# Patient Record
Sex: FEMALE | Race: WHITE | NOT HISPANIC OR LATINO | Employment: FULL TIME | URBAN - METROPOLITAN AREA
[De-identification: names, ages, dates, MRNs, and addresses within clinical notes are randomized per-mention and may not be internally consistent; named-entity substitution may affect disease eponyms.]

---

## 2020-12-03 ENCOUNTER — OFFICE VISIT (OUTPATIENT)
Dept: FAMILY MEDICINE CLINIC | Facility: CLINIC | Age: 60
End: 2020-12-03

## 2020-12-03 VITALS
OXYGEN SATURATION: 99 % | BODY MASS INDEX: 23.05 KG/M2 | HEIGHT: 64 IN | TEMPERATURE: 97.1 F | WEIGHT: 135 LBS | DIASTOLIC BLOOD PRESSURE: 80 MMHG | RESPIRATION RATE: 16 BRPM | SYSTOLIC BLOOD PRESSURE: 130 MMHG | HEART RATE: 67 BPM

## 2020-12-03 DIAGNOSIS — Z00.00 WELL ADULT EXAM: ICD-10-CM

## 2020-12-03 DIAGNOSIS — Z20.89 HOUSEHOLD CONTACT WITH HISTORY OF METHICILLIN RESISTANT STAPHYLOCOCCUS AUREUS (MRSA) INFECTION: ICD-10-CM

## 2020-12-03 DIAGNOSIS — Z12.31 SCREENING MAMMOGRAM, ENCOUNTER FOR: Primary | ICD-10-CM

## 2020-12-03 PROCEDURE — 99386 PREV VISIT NEW AGE 40-64: CPT | Performed by: INTERNAL MEDICINE

## 2020-12-03 RX ORDER — DIPHENOXYLATE HYDROCHLORIDE AND ATROPINE SULFATE 2.5; .025 MG/1; MG/1
TABLET ORAL DAILY
COMMUNITY

## 2020-12-03 NOTE — PROGRESS NOTES
"Subjective   Beronica Vinson is a 60 y.o. female who comes in today for   Chief Complaint   Patient presents with   • Annual Exam      cpe    • Establish Care     np    .    History of Present Illness   Here as a new pt to me.  Just moved back to [place] from [place] but her and her  are from [place] originally.  25 years ago her  had a heart transplant due to cardiomyopathy.  Recently he was hospitalized due to a skin infection and had to have skin grafting after an injury.  He was found to have MRSA.  Recently over the past month she has noticed some pimple-like areas on her face and scalp line.  She took some of his antibiotic and it cleared it up.  This week she is had some symptoms within her mouth that she is wondering if could be related to staph.  She has an appointment with her dentist this afternoon.  No fever.  No's other URI symptoms.  Has osteoporosis in the spine and she started on fosamax and it caused nose bleeds.  She stopped it 1/2020 and she chose to monitor her sxs.  Her dexa is due in 8/2021.  Her last pap smear was 18-24 months.  She is due for a pap smear.  MMG was 18 mo ago and was getting them yearly.  Had CN within the past 2-3 years in [place], [place] at [facility].  He has had a flu shot and has had her shingles vaccine.  She states that her last tetanus was about 5 years ago.  She is never treated herself with any kind of ointment in the nasal passages at night.  The following portions of the patient's history were reviewed and updated as appropriate: allergies, current medications, past family history, past medical history, past social history, past surgical history and problem list.    Review of Systems   Constitutional: Negative.    Skin:        MRSA contact at home with    Psychiatric/Behavioral: Negative.        /80   Pulse 67   Temp 97.1 °F (36.2 °C) (Temporal)   Resp 16   Ht 162.6 cm (64\")   Wt 61.2 kg (135 lb)   SpO2 99%   BMI 23.17 " kg/m²     STEADI Fall Risk Assessment has not been completed.    No flowsheet data found.    Objective   Physical Exam  Vitals signs and nursing note reviewed.   Constitutional:       Appearance: Normal appearance. She is well-developed and normal weight.   HENT:      Head: Normocephalic and atraumatic.      Right Ear: External ear normal.      Left Ear: External ear normal.      Mouth/Throat:      Mouth: Mucous membranes are moist.      Pharynx: Oropharynx is clear. No oropharyngeal exudate or posterior oropharyngeal erythema.   Eyes:      Conjunctiva/sclera: Conjunctivae normal.   Neck:      Musculoskeletal: Neck supple.      Vascular: No carotid bruit.   Cardiovascular:      Rate and Rhythm: Normal rate and regular rhythm.      Heart sounds: Normal heart sounds.      Comments: No bruits  Pulmonary:      Effort: Pulmonary effort is normal. No respiratory distress.      Breath sounds: Normal breath sounds. No wheezing or rales.   Abdominal:      General: Bowel sounds are normal. There is no distension.      Palpations: Abdomen is soft. There is no mass.      Tenderness: There is no abdominal tenderness.   Lymphadenopathy:      Cervical: No cervical adenopathy.   Skin:     General: Skin is warm.   Neurological:      General: No focal deficit present.      Mental Status: She is alert and oriented to person, place, and time.   Psychiatric:         Mood and Affect: Mood normal.         Behavior: Behavior normal.         Thought Content: Thought content normal.         Judgment: Judgment normal.           Current Outpatient Medications:   •  multivitamin (MULTI-VITAMINS PO), Take  by mouth Daily., Disp: , Rfl:   •  mupirocin (Bactroban) 2 % ointment, Apply  topically to the appropriate area as directed 3 (Three) Times a Day., Disp: 30 g, Rfl: 0    Assessment/Plan   Diagnoses and all orders for this visit:    1. Screening mammogram, encounter for (Primary)  -     Mammo Screening Bilateral With CAD    2. Well adult  exam  -     Mammo Screening Bilateral With CAD  -     CBC & Differential  -     Comprehensive Metabolic Panel  -     Lipid Panel With LDL / HDL Ratio  -     TSH  -     T4, Free  -     Urinalysis With Culture If Indicated -  -     Vitamin D 25 Hydroxy    3. Household contact with history of methicillin resistant Staphylococcus aureus (MRSA) infection  -     mupirocin (Bactroban) 2 % ointment; Apply  topically to the appropriate area as directed 3 (Three) Times a Day.  Dispense: 30 g; Refill: 0    I did order a screening mammogram as it is overdue.  I have also asked her to get her old images from [place] so that when she goes for her mammography in [place] they can have images to compare.  I have asked her to get old records from [place] that would include her vaccinations as well as her last colonoscopy and when to repeat it.  She is can work on that.  She will come back in 4 to 5 months for a Pap smear with me.  At that time we can adjust any kind of health maintenance issues.  I have ordered fasting labs today.  I did give her a prescription for mupirocin ointment to use in bilateral nostrils nightly for 7 days.  I also discussed with her she might want to talk to her  about reaching out to his doctor about ways to prevent colonization and future infection.               I have asked for the patient to return to clinic in 12month(s).

## 2020-12-04 ENCOUNTER — TRANSCRIBE ORDERS (OUTPATIENT)
Dept: ADMINISTRATIVE | Facility: HOSPITAL | Age: 60
End: 2020-12-04

## 2020-12-04 DIAGNOSIS — Z12.31 SCREENING MAMMOGRAM, ENCOUNTER FOR: Primary | ICD-10-CM

## 2020-12-06 LAB
25(OH)D3+25(OH)D2 SERPL-MCNC: 69.3 NG/ML (ref 30–100)
ALBUMIN SERPL-MCNC: 4.6 G/DL (ref 3.5–5.2)
ALBUMIN/GLOB SERPL: 2.2 G/DL
ALP SERPL-CCNC: 64 U/L (ref 39–117)
ALT SERPL-CCNC: 15 U/L (ref 1–33)
APPEARANCE UR: CLEAR
AST SERPL-CCNC: 17 U/L (ref 1–32)
BACTERIA #/AREA URNS HPF: NORMAL /HPF
BACTERIA UR CULT: NORMAL
BACTERIA UR CULT: NORMAL
BASOPHILS # BLD AUTO: 0.05 10*3/MM3 (ref 0–0.2)
BASOPHILS NFR BLD AUTO: 1 % (ref 0–1.5)
BILIRUB SERPL-MCNC: 0.5 MG/DL (ref 0–1.2)
BILIRUB UR QL STRIP: NEGATIVE
BUN SERPL-MCNC: 13 MG/DL (ref 8–23)
BUN/CREAT SERPL: 17.3 (ref 7–25)
CALCIUM SERPL-MCNC: 9.3 MG/DL (ref 8.6–10.5)
CHLORIDE SERPL-SCNC: 101 MMOL/L (ref 98–107)
CHOLEST SERPL-MCNC: 169 MG/DL (ref 0–200)
CO2 SERPL-SCNC: 29.9 MMOL/L (ref 22–29)
COLOR UR: YELLOW
CREAT SERPL-MCNC: 0.75 MG/DL (ref 0.57–1)
EOSINOPHIL # BLD AUTO: 0.11 10*3/MM3 (ref 0–0.4)
EOSINOPHIL NFR BLD AUTO: 2.2 % (ref 0.3–6.2)
EPI CELLS #/AREA URNS HPF: NORMAL /HPF (ref 0–10)
ERYTHROCYTE [DISTWIDTH] IN BLOOD BY AUTOMATED COUNT: 12.9 % (ref 12.3–15.4)
GLOBULIN SER CALC-MCNC: 2.1 GM/DL
GLUCOSE SERPL-MCNC: 89 MG/DL (ref 65–99)
GLUCOSE UR QL: NEGATIVE
HCT VFR BLD AUTO: 42.4 % (ref 34–46.6)
HDLC SERPL-MCNC: 78 MG/DL (ref 40–60)
HGB BLD-MCNC: 14 G/DL (ref 12–15.9)
HGB UR QL STRIP: NEGATIVE
IMM GRANULOCYTES # BLD AUTO: 0.01 10*3/MM3 (ref 0–0.05)
IMM GRANULOCYTES NFR BLD AUTO: 0.2 % (ref 0–0.5)
KETONES UR QL STRIP: NEGATIVE
LDLC SERPL CALC-MCNC: 81 MG/DL (ref 0–100)
LDLC/HDLC SERPL: 1.05 {RATIO}
LEUKOCYTE ESTERASE UR QL STRIP: ABNORMAL
LYMPHOCYTES # BLD AUTO: 1.51 10*3/MM3 (ref 0.7–3.1)
LYMPHOCYTES NFR BLD AUTO: 30 % (ref 19.6–45.3)
MCH RBC QN AUTO: 30.8 PG (ref 26.6–33)
MCHC RBC AUTO-ENTMCNC: 33 G/DL (ref 31.5–35.7)
MCV RBC AUTO: 93.2 FL (ref 79–97)
MICRO URNS: ABNORMAL
MONOCYTES # BLD AUTO: 0.45 10*3/MM3 (ref 0.1–0.9)
MONOCYTES NFR BLD AUTO: 8.9 % (ref 5–12)
NEUTROPHILS # BLD AUTO: 2.9 10*3/MM3 (ref 1.7–7)
NEUTROPHILS NFR BLD AUTO: 57.7 % (ref 42.7–76)
NITRITE UR QL STRIP: NEGATIVE
NRBC BLD AUTO-RTO: 0 /100 WBC (ref 0–0.2)
PH UR STRIP: 7.5 [PH] (ref 5–7.5)
PLATELET # BLD AUTO: 220 10*3/MM3 (ref 140–450)
POTASSIUM SERPL-SCNC: 4 MMOL/L (ref 3.5–5.2)
PROT SERPL-MCNC: 6.7 G/DL (ref 6–8.5)
PROT UR QL STRIP: NEGATIVE
RBC # BLD AUTO: 4.55 10*6/MM3 (ref 3.77–5.28)
RBC #/AREA URNS HPF: NORMAL /HPF (ref 0–2)
SODIUM SERPL-SCNC: 138 MMOL/L (ref 136–145)
SP GR UR: 1.01 (ref 1–1.03)
T4 FREE SERPL-MCNC: 1.09 NG/DL (ref 0.93–1.7)
TRIGL SERPL-MCNC: 45 MG/DL (ref 0–150)
TSH SERPL DL<=0.005 MIU/L-ACNC: 1.36 UIU/ML (ref 0.27–4.2)
URINALYSIS REFLEX: ABNORMAL
UROBILINOGEN UR STRIP-MCNC: 0.2 MG/DL (ref 0.2–1)
VLDLC SERPL CALC-MCNC: 10 MG/DL (ref 5–40)
WBC # BLD AUTO: 5.03 10*3/MM3 (ref 3.4–10.8)
WBC #/AREA URNS HPF: NORMAL /HPF (ref 0–5)

## 2020-12-24 ENCOUNTER — HOSPITAL ENCOUNTER (OUTPATIENT)
Dept: MAMMOGRAPHY | Facility: HOSPITAL | Age: 60
Discharge: HOME | End: 2020-12-24
Admitting: INTERNAL MEDICINE

## 2020-12-24 DIAGNOSIS — Z12.31 SCREENING MAMMOGRAM, ENCOUNTER FOR: ICD-10-CM

## 2020-12-24 PROCEDURE — 77063 BREAST TOMOSYNTHESIS BI: CPT

## 2020-12-24 PROCEDURE — 77067 SCR MAMMO BI INCL CAD: CPT

## 2021-01-19 DIAGNOSIS — M54.50 LOW BACK PAIN WITHOUT SCIATICA, UNSPECIFIED BACK PAIN LATERALITY, UNSPECIFIED CHRONICITY: Primary | ICD-10-CM

## 2021-01-22 LAB
APPEARANCE UR: CLEAR
BACTERIA #/AREA URNS HPF: NORMAL /HPF
BACTERIA UR CULT: NORMAL
BACTERIA UR CULT: NORMAL
BILIRUB UR QL STRIP: NEGATIVE
COLOR UR: YELLOW
EPI CELLS #/AREA URNS HPF: NORMAL /HPF (ref 0–10)
GLUCOSE UR QL: NEGATIVE
HGB UR QL STRIP: NEGATIVE
KETONES UR QL STRIP: NEGATIVE
LEUKOCYTE ESTERASE UR QL STRIP: ABNORMAL
MICRO URNS: ABNORMAL
MUCOUS THREADS URNS QL MICRO: PRESENT /HPF
NITRITE UR QL STRIP: NEGATIVE
PH UR STRIP: 7.5 [PH] (ref 5–7.5)
PROT UR QL STRIP: NEGATIVE
RBC #/AREA URNS HPF: NORMAL /HPF (ref 0–2)
SP GR UR: 1.02 (ref 1–1.03)
URINALYSIS REFLEX: ABNORMAL
UROBILINOGEN UR STRIP-MCNC: 0.2 MG/DL (ref 0.2–1)
WBC #/AREA URNS HPF: NORMAL /HPF (ref 0–5)

## 2021-03-22 ENCOUNTER — BULK ORDERING (OUTPATIENT)
Dept: CASE MANAGEMENT | Facility: OTHER | Age: 61
End: 2021-03-22

## 2021-03-22 DIAGNOSIS — Z23 IMMUNIZATION DUE: ICD-10-CM

## 2021-05-05 ENCOUNTER — OFFICE VISIT (OUTPATIENT)
Dept: FAMILY MEDICINE CLINIC | Facility: CLINIC | Age: 61
End: 2021-05-05

## 2021-05-05 VITALS
BODY MASS INDEX: 24.07 KG/M2 | DIASTOLIC BLOOD PRESSURE: 82 MMHG | HEART RATE: 63 BPM | OXYGEN SATURATION: 98 % | WEIGHT: 141 LBS | RESPIRATION RATE: 16 BRPM | HEIGHT: 64 IN | SYSTOLIC BLOOD PRESSURE: 130 MMHG | TEMPERATURE: 97.5 F

## 2021-05-05 DIAGNOSIS — M81.0 OSTEOPOROSIS, UNSPECIFIED OSTEOPOROSIS TYPE, UNSPECIFIED PATHOLOGICAL FRACTURE PRESENCE: Primary | ICD-10-CM

## 2021-05-05 DIAGNOSIS — Z78.0 MENOPAUSE: ICD-10-CM

## 2021-05-05 DIAGNOSIS — Z01.419 ENCOUNTER FOR CERVICAL PAP SMEAR WITH PELVIC EXAM: ICD-10-CM

## 2021-05-05 PROCEDURE — 99214 OFFICE O/P EST MOD 30 MIN: CPT | Performed by: INTERNAL MEDICINE

## 2021-05-05 NOTE — PROGRESS NOTES
"Chief Complaint  pap and ref (pt states needs ref to podiatrist rt big toe )    Subjective          Beronica Vinson presents to [facility]  History of Present Illness  Here for pap smear and breast exam.  Has a jammed, sore toe on the right second toe for few months.  Wants a podiatry referral.  Last pap smear over a year or two ago.  No h/o abnormal pap smears.  No PM bleeding or pain.  Moving bowels normally for her.  MMG neg 12/2020.  CN 11/2015 and repeat in 10 years.   Has had covid vaccinations.  dexa is due in 9/2021 and previous two in 2017 and 2019 showed osteoporosis.  She didn't tolerate fosamax due to nose bleeds.  Working at [employer] firm.  Takes an otc D3 and level is great.   Objective   Vital Signs:   /82   Pulse 63   Temp 97.5 °F (36.4 °C) (Temporal)   Resp 16   Ht 162.6 cm (64\")   Wt 64 kg (141 lb)   SpO2 98%   BMI 24.20 kg/m²     Physical Exam  Vitals and nursing note reviewed.   Constitutional:       Appearance: Normal appearance. She is well-developed.   HENT:      Head: Normocephalic and atraumatic.   Eyes:      Extraocular Movements: Extraocular movements intact.      Conjunctiva/sclera: Conjunctivae normal.   Pulmonary:      Effort: Pulmonary effort is normal.   Chest:      Breasts:         Right: No inverted nipple, mass, nipple discharge, skin change or tenderness.         Left: No inverted nipple, mass, nipple discharge, skin change or tenderness.   Genitourinary:     Exam position: Knee-chest position.      Labia:         Right: No rash, tenderness, lesion or injury.         Left: No rash, tenderness, lesion or injury.       Vagina: Normal.      Cervix: No cervical motion tenderness, discharge or friability.      Uterus: Not deviated, not enlarged, not fixed and not tender.       Adnexa:         Right: No mass, tenderness or fullness.          Left: No mass, tenderness or fullness.        Rectum: Normal. Guaiac result negative.   Skin:     General: " Skin is warm and dry.   Neurological:      General: No focal deficit present.      Mental Status: She is alert and oriented to person, place, and time.   Psychiatric:         Mood and Affect: Mood normal.         Behavior: Behavior normal.         Thought Content: Thought content normal.         Judgment: Judgment normal.        Result Review :                Mammo Screening Digital Tomosynthesis Bilateral With CAD (12/24/2020 11:42)    Assessment and Plan    Diagnoses and all orders for this visit:    1. Osteoporosis, unspecified osteoporosis type, unspecified pathological fracture presence (Primary)    2. Encounter for cervical Pap smear with pelvic exam    3. Menopause  -     DEXA Bone Density Axial; Future        Follow Up   No follow-ups on file.  Patient was given instructions and counseling regarding her condition or for health maintenance advice. Please see specific information pulled into the AVS if appropriate.       Answers for HPI/ROS submitted by the patient on 4/28/2021  What is the primary reason for your visit?: Physical    I have reviewed records from [place]; DEXA is from 2017 and 2019 which show osteoporosis.  The 2019 DEXA does show decline in bone density.  However she did not tolerate the Fosamax due to nosebleeds.  We have discussed alternative options for treatment of osteoporosis including Actonel, Boniva, Prolia, or reclass.  At this time she declines other treatment and would like to move forward for repeat DEXA scan in September 2021 which I have ordered.  In the meantime, she will continue her vitamin D supplementation and she will work on increasing her exercise and weightbearing activities.  She also is good about eating calcium in her diet and avoid soft drinks.  Her Pap smear was done today and her mammogram is up-to-date.  I will see her back in 1 year for a physical.  Total time coordinating care and reviewing old records and proposing future plan 25 minutes

## 2021-05-07 LAB
CONV .: NORMAL
CYTOLOGIST CVX/VAG CYTO: NORMAL
CYTOLOGY CVX/VAG DOC CYTO: NORMAL
CYTOLOGY CVX/VAG DOC THIN PREP: NORMAL
DX ICD CODE: NORMAL
HIV 1 & 2 AB SER-IMP: NORMAL
OTHER STN SPEC: NORMAL
STAT OF ADQ CVX/VAG CYTO-IMP: NORMAL

## 2021-09-08 ENCOUNTER — OFFICE VISIT (OUTPATIENT)
Dept: FAMILY MEDICINE CLINIC | Facility: CLINIC | Age: 61
End: 2021-09-08

## 2021-09-08 VITALS
RESPIRATION RATE: 16 BRPM | TEMPERATURE: 98.2 F | HEART RATE: 73 BPM | WEIGHT: 141.7 LBS | SYSTOLIC BLOOD PRESSURE: 152 MMHG | HEIGHT: 64 IN | DIASTOLIC BLOOD PRESSURE: 85 MMHG | OXYGEN SATURATION: 99 % | BODY MASS INDEX: 24.19 KG/M2

## 2021-09-08 DIAGNOSIS — L03.011 ABSCESS AROUND FINGERNAIL OF RIGHT HAND: Primary | ICD-10-CM

## 2021-09-08 DIAGNOSIS — L03.019 PARONYCHIA OF FINGER, UNSPECIFIED LATERALITY: ICD-10-CM

## 2021-09-08 PROCEDURE — 10060 I&D ABSCESS SIMPLE/SINGLE: CPT | Performed by: NURSE PRACTITIONER

## 2021-09-08 RX ORDER — AMOXICILLIN AND CLAVULANATE POTASSIUM 875; 125 MG/1; MG/1
1 TABLET, FILM COATED ORAL 2 TIMES DAILY
Qty: 14 TABLET | Refills: 0 | Status: SHIPPED | OUTPATIENT
Start: 2021-09-08 | End: 2021-09-17

## 2021-09-08 RX ORDER — IBUPROFEN 800 MG/1
800 TABLET ORAL EVERY 8 HOURS PRN
Qty: 20 TABLET | Refills: 0 | Status: SHIPPED | OUTPATIENT
Start: 2021-09-08 | End: 2022-05-25

## 2021-09-08 NOTE — PROGRESS NOTES
"Chief Complaint  Pain (POSSIBLE INFECTION ON MIDDLE FINGER ON RIGHT HAND. PT STATES SHE NOTICED IT GETTING BAD YESTERDAY. )    Subjective          Beronica Vinson presents to [facility]  Right middle finger infection since last night pain and tenderness no fever no chills  Nothing makes better or worse symptoms are moderate  No sweats diaphoresis or fatigue      Objective   Vital Signs:   /85   Pulse 73   Temp 98.2 °F (36.8 °C) (Infrared)   Resp 16   Ht 162.6 cm (64\")   Wt 64.3 kg (141 lb 11.2 oz)   SpO2 99%   BMI 24.32 kg/m²     Physical Exam  Constitutional:       General: She is not in acute distress.     Appearance: Normal appearance. She is not ill-appearing, toxic-appearing or diaphoretic.      Comments: Pleasant appears well   Musculoskeletal:      Comments: Left middle finger distal lateral abscess grayish-white approximately 1/3 cm fluctuant with the lateral aspect red tender volar aspect nontender some redness the medial aspect approximately approximately 2 cm and  no streaks of the arms  Normal range of motion normal hand normal wrist   Neurological:      Mental Status: She is alert.   Psychiatric:         Mood and Affect: Mood normal.         Behavior: Behavior normal.        Result Review :                 Assessment and Plan    There are no diagnoses linked to this encounter.    Follow Up   No follow-ups on file.  Patient was given instructions and counseling regarding her condition or for health maintenance advice. Please see specific information pulled into the AVS if appropriate.     Procedure  Risk-benefit and alternatives discussed  She has an abscess and potentially serious infection I&D is the appropriate treatment risk-benefit  She would like to proceed with I&D and antibiotic therapy with close follow-up    Procedure  After explaining the procedure thoroughly  1 cc lidocaine without  Injected right middle finger distally proximal to the abscess " distally and surrounding tissue  After cleaning with alcohol.  Copious irrigation  Appropriate draping  Using aseptic technique carefully  Used a scalpel and made a careful incision approximately 5 mm with an approximately 5 mm depth.  Expressed approximate 1/2 cm purulent drainage  Obtained wound culture  Carefully inspected abscess and finger  Carefully expressed and firmly any remaining purulence  Copious irrigation  Dressing applied  Hemostasis without intervention  Patient tolerated procedure well    Appropriate dressing appropriate teaching    She will start the antibiotic now Augmentin  Keep and heart level  Soak 3 times a day soapy water then appropriate dressing  Express to keep the wound draining I&D draining the next several days  She will follow-up with me in 2 days for recheck  Any increasing pain fever chills streaks up the arms or hands  Any worsening problems should to be fever nausea vomiting or severe hand pain finger pain worsening symptoms emergency room    Patient is leaving without distress she can use ibuprofen 803 times a day for pain if needed          Risk include treatment failure further injury chronic paresthesia  She will need close follow-up Augmentin in the systemic signs of fever  Nausea vomiting high fever increased fatigue weakness palpitations racing heart emergency room

## 2021-09-10 ENCOUNTER — OFFICE VISIT (OUTPATIENT)
Dept: FAMILY MEDICINE CLINIC | Facility: CLINIC | Age: 61
End: 2021-09-10

## 2021-09-10 VITALS
BODY MASS INDEX: 24.2 KG/M2 | HEIGHT: 64 IN | TEMPERATURE: 97.7 F | WEIGHT: 141.76 LBS | RESPIRATION RATE: 16 BRPM | DIASTOLIC BLOOD PRESSURE: 60 MMHG | SYSTOLIC BLOOD PRESSURE: 118 MMHG | OXYGEN SATURATION: 99 % | HEART RATE: 69 BPM

## 2021-09-10 DIAGNOSIS — L03.019 PARONYCHIA OF FINGER, UNSPECIFIED LATERALITY: ICD-10-CM

## 2021-09-10 DIAGNOSIS — Z22.322 MRSA (METHICILLIN RESISTANT STAPH AUREUS) CULTURE POSITIVE: Primary | ICD-10-CM

## 2021-09-10 PROCEDURE — 99024 POSTOP FOLLOW-UP VISIT: CPT | Performed by: NURSE PRACTITIONER

## 2021-09-10 NOTE — PROGRESS NOTES
"Chief Complaint  Follow-up (infected right finger)    Subjective          Beronica Vinson presents to [facility]  Patient is here today to discuss recent culture positive MRSA sensitive to clindamycin regarding a recent I&D and abscess,  I switched her medication to clindamycin, she is feeling much better she has good movement no increasing pain no fever no chills  Skin is closed with a small scab distal tip.          Objective   Vital Signs:   /60   Pulse 69   Temp 97.7 °F (36.5 °C) (Infrared)   Resp 16   Ht 162.6 cm (64\")   Wt 64.3 kg (141 lb 12.1 oz)   SpO2 99%   BMI 24.33 kg/m²     Physical Exam  Constitutional:       Appearance: Normal appearance. She is not ill-appearing or diaphoretic.   Pulmonary:      Effort: Pulmonary effort is normal.   Musculoskeletal:         General: Normal range of motion.      Comments: Left hand middle finger  Distal tip healing nicely there is no redness no tenderness swelling has resolved and she has a scab on the lateral aspect.  Palpated each joint DIP PIP metacarpal joint as well as all flexor and extensor tendons including hand and wrist full range of motion nontender    Skin:     Capillary Refill: Capillary refill takes less than 2 seconds.   Neurological:      General: No focal deficit present.      Mental Status: She is alert and oriented to person, place, and time.        Result Review :                 Assessment and Plan    Diagnoses and all orders for this visit:    1. MRSA (methicillin resistant staph aureus) culture positive (Primary)    2. Paronychia of finger, unspecified laterality        Follow Up   No follow-ups on file.  Patient was given instructions and counseling regarding her condition or for health maintenance advice. Please see specific information pulled into the AVS if appropriate.     Clindamycin to be extended for total of 12 days  Risk-benefit including risk of colitis  Yogurt  Follow-up Dr. Dyer for " routine care    Office visit 15 minutes

## 2021-09-11 LAB
BACTERIA SPEC CULT: ABNORMAL
MICROORGANISM/AGENT SPEC: ABNORMAL
OTHER ANTIBIOTIC SUSC ISLT: ABNORMAL

## 2021-09-13 RX ORDER — CLINDAMYCIN HYDROCHLORIDE 300 MG/1
300 CAPSULE ORAL 4 TIMES DAILY
Qty: 28 CAPSULE | Refills: 0 | Status: SHIPPED | OUTPATIENT
Start: 2021-09-13 | End: 2021-09-17 | Stop reason: SDUPTHER

## 2021-09-14 ENCOUNTER — TELEPHONE (OUTPATIENT)
Dept: FAMILY MEDICINE CLINIC | Facility: CLINIC | Age: 61
End: 2021-09-14

## 2021-09-14 NOTE — TELEPHONE ENCOUNTER
Hub staff attempted to follow warm transfer process and was unsuccessful     Caller: Beronica Vinson    Relationship to patient: Self    Best call back number: [phone] (H)    Patient is needing: APPOINTMENT BEING SCHEDULED FOR Friday PER DR. NESS.        THANKS

## 2021-09-17 ENCOUNTER — OFFICE VISIT (OUTPATIENT)
Dept: FAMILY MEDICINE CLINIC | Facility: CLINIC | Age: 61
End: 2021-09-17

## 2021-09-17 ENCOUNTER — HOSPITAL ENCOUNTER (OUTPATIENT)
Dept: BONE DENSITY | Facility: HOSPITAL | Age: 61
End: 2021-09-17

## 2021-09-17 VITALS
DIASTOLIC BLOOD PRESSURE: 73 MMHG | OXYGEN SATURATION: 98 % | HEART RATE: 64 BPM | SYSTOLIC BLOOD PRESSURE: 133 MMHG | TEMPERATURE: 96.8 F

## 2021-09-17 DIAGNOSIS — L08.9 INFECTION OF SKIN DUE TO METHICILLIN RESISTANT STAPHYLOCOCCUS AUREUS (MRSA): Primary | ICD-10-CM

## 2021-09-17 DIAGNOSIS — B95.62 INFECTION OF SKIN DUE TO METHICILLIN RESISTANT STAPHYLOCOCCUS AUREUS (MRSA): Primary | ICD-10-CM

## 2021-09-17 PROCEDURE — 99024 POSTOP FOLLOW-UP VISIT: CPT | Performed by: NURSE PRACTITIONER

## 2021-09-17 RX ORDER — CLINDAMYCIN HYDROCHLORIDE 300 MG/1
300 CAPSULE ORAL 4 TIMES DAILY
Qty: 20 CAPSULE | Refills: 0 | Status: SHIPPED | OUTPATIENT
Start: 2021-09-17 | End: 2022-05-25

## 2021-09-17 NOTE — PROGRESS NOTES
Patient is doing qian without pain full range of motion of her finger no redness or swelling has a scab which is healing  MRSA positive with a small abscess and paronychia  Status post I&D,  Started on clindamycin recently will extend this up to 12 days patient agrees and would like to do so as well of her   Immunosuppressed with healthy heart replacement 25 years ago  She is fully vaccinated Covid

## 2021-09-17 NOTE — PATIENT INSTRUCTIONS
Patient doing quite well responded nicely to the antibiotics  She HAS  an MRSA infection, and continuing this for 5 more days for total of 12 days treatment    Increased pain redness swelling urgent recheck er

## 2021-09-27 NOTE — PROGRESS NOTES
Chief Complaint  Hand Injury (F/U R MIDDLE FINGES ABCESS)    Subjective          Beronica Vinson presents to [facility]  Patient is here today follow-up from a positive MRSA culture  Right middle finger abscess grew staph, MRSA but sensitive to clindamycin  Antibiotics were changed to clindamycin for a total of 12 days several discussions have had with patient since then she is doing quite well no fevers no chills no pain  She has full range of motion and full function of her hand without increased pain numbness or any disability and overall she is very pleased.  No problems with clindamycin taking yogurt as well  We discussed risk and benefit thoroughly including the risk of C. difficile she has no history of C. Difficile        Hand Injury         Objective   Vital Signs:   /73   Pulse 64   Temp 96.8 °F (36 °C)   SpO2 98%     Physical Exam  Constitutional:       Appearance: Normal appearance.   Musculoskeletal:      Comments: Full range of motion of left hand and finger she has full extension DIP PIP metacarpal joint without redness or pain she has a small scab and some healing scar tissue along the aspect lateral of her finger but no palpable abscess no redness no streaking it looks very very nice   Skin:     General: Skin is warm and dry.   Neurological:      Mental Status: She is alert.   Psychiatric:         Mood and Affect: Mood normal.        Result Review :                 Assessment and Plan    Diagnoses and all orders for this visit:    1. Infection of skin due to methicillin resistant Staphylococcus aureus (MRSA) (Primary)    Other orders  -     clindamycin (Cleocin) 300 MG capsule; Take 1 capsule by mouth 4 (Four) Times a Day. For MRSA infection sensitive to clindamycin, additional 5 days  Dispense: 20 capsule; Refill: 0        Follow Up   No follow-ups on file.  Patient was given instructions and counseling regarding her condition or for health maintenance  advice. Please see specific information pulled into the AVS if appropriate.   She will complete the course of clindamycin  Recheck for any new abscess formation urgently    Follow-up with Dr. Dyer routine care urgent recheck ER for any night sweats fevers chest pain shortness of breath    Patient is doing qian without pain full range of motion of her finger no redness or swelling has a scab which is healing  MRSA positive with a small abscess and paronychia  Status post I&D,  Started on clindamycin recently will extend this up to 12 days patient agrees and would like to do so as well of her   Immunosuppressed with healthy heart replacement 25 years ago  She is fully vaccinated Covid

## 2022-01-07 ENCOUNTER — APPOINTMENT (OUTPATIENT)
Dept: BONE DENSITY | Facility: HOSPITAL | Age: 62
End: 2022-01-07

## 2022-01-10 ENCOUNTER — HOSPITAL ENCOUNTER (OUTPATIENT)
Dept: BONE DENSITY | Facility: HOSPITAL | Age: 62
Discharge: HOME | End: 2022-01-10
Admitting: INTERNAL MEDICINE

## 2022-01-10 DIAGNOSIS — Z78.0 MENOPAUSE: ICD-10-CM

## 2022-01-10 PROCEDURE — 77080 DXA BONE DENSITY AXIAL: CPT

## 2022-01-13 RX ORDER — RISEDRONATE SODIUM 150 MG/1
150 TABLET, FILM COATED ORAL
Qty: 4 TABLET | Refills: 3 | Status: SHIPPED | OUTPATIENT
Start: 2022-01-13 | End: 2023-04-05

## 2022-05-25 ENCOUNTER — OFFICE VISIT (OUTPATIENT)
Dept: FAMILY MEDICINE CLINIC | Facility: CLINIC | Age: 62
End: 2022-05-25

## 2022-05-25 ENCOUNTER — TRANSCRIBE ORDERS (OUTPATIENT)
Dept: ADMINISTRATIVE | Facility: HOSPITAL | Age: 62
End: 2022-05-25

## 2022-05-25 VITALS
TEMPERATURE: 96.4 F | BODY MASS INDEX: 24.41 KG/M2 | RESPIRATION RATE: 16 BRPM | OXYGEN SATURATION: 98 % | HEIGHT: 64 IN | WEIGHT: 143 LBS | HEART RATE: 64 BPM | SYSTOLIC BLOOD PRESSURE: 112 MMHG | DIASTOLIC BLOOD PRESSURE: 82 MMHG

## 2022-05-25 DIAGNOSIS — Z00.00 WELL ADULT EXAM: ICD-10-CM

## 2022-05-25 DIAGNOSIS — Z12.31 SCREENING MAMMOGRAM, ENCOUNTER FOR: Primary | ICD-10-CM

## 2022-05-25 DIAGNOSIS — Z12.31 SCREENING MAMMOGRAM FOR HIGH-RISK PATIENT: Primary | ICD-10-CM

## 2022-05-25 PROCEDURE — 99396 PREV VISIT EST AGE 40-64: CPT | Performed by: INTERNAL MEDICINE

## 2022-05-25 NOTE — PROGRESS NOTES
"Chief Complaint  Annual Exam    Subjective          Beronica Vinson presents to [facility]  History of Present Illness  Here for complete physical.  Mammogram is due.  Bone density is up-to-date and shows osteoporosis.  Actonel was recently started for osteoporosis.  Colonoscopy was done in [place] in 2015 with repeat due in 2025.  Pap smear was negative in May 2021.  Seeing dermatology for recurrent staph in scalp.  Has used cleocin which helps clear up the spots at the time.  Has mupirocin ointment and has used that in nares as well.  Picks at scalp and eyebrows and that is where the staph occur.    Objective   Vital Signs:  /82   Pulse 64   Temp 96.4 °F (35.8 °C) (Temporal)   Resp 16   Ht 162.6 cm (64\")   Wt 64.9 kg (143 lb)   SpO2 98%   BMI 24.55 kg/m²   BMI is within normal parameters. No other follow-up for BMI required.      Physical Exam  Vitals and nursing note reviewed.   Constitutional:       Appearance: Normal appearance. She is well-developed.   HENT:      Head: Normocephalic and atraumatic.      Right Ear: External ear normal.      Left Ear: External ear normal.   Eyes:      Extraocular Movements: Extraocular movements intact.      Conjunctiva/sclera: Conjunctivae normal.   Neck:      Vascular: No carotid bruit.   Cardiovascular:      Rate and Rhythm: Normal rate and regular rhythm.      Heart sounds: Normal heart sounds.      Comments: No bruits  Pulmonary:      Effort: Pulmonary effort is normal. No respiratory distress.      Breath sounds: Normal breath sounds. No wheezing or rales.   Abdominal:      General: Bowel sounds are normal. There is no distension.      Palpations: Abdomen is soft. There is no mass.      Tenderness: There is no abdominal tenderness.   Musculoskeletal:      Cervical back: Neck supple.   Lymphadenopathy:      Cervical: No cervical adenopathy.   Skin:     General: Skin is warm.   Neurological:      General: No focal deficit " present.      Mental Status: She is alert and oriented to person, place, and time.   Psychiatric:         Mood and Affect: Mood normal.         Behavior: Behavior normal.         Thought Content: Thought content normal.         Judgment: Judgment normal.        Result Review :                 Assessment and Plan    Diagnoses and all orders for this visit:    1. Screening mammogram, encounter for (Primary)  -     Mammo Screening Bilateral With CAD; Future    2. Well adult exam  -     CBC & Differential  -     Comprehensive Metabolic Panel  -     Lipid Panel With LDL / HDL Ratio  -     TSH  -     T4, Free  -     Urinalysis With Culture If Indicated -  -     Vitamin D 25 Hydroxy             Follow Up   No follow-ups on file.  Patient was given instructions and counseling regarding her condition or for health maintenance advice. Please see specific information pulled into the AVS if appropriate.     Screening mammogram ordered.  Vaccinations are up-to-date other than she may benefit from a COVID booster #2.  She will consider that at a later time.  Fasting labs have been ordered for patient today.  I also highly encouraged her to get back in with her dermatologist regarding the recurrent nature of the itching and what appears to be mild infection in her scalp and eyebrows.  I wonder if she does not have seborrhea starting.  There also is an area behind her left ear that is been there for a while.  It looks like a bug bite but it has not resolved.  I have suggested that she get back into her dermatologist before her next appointment.  I will see her back in 1 year or sooner if she should need me.  Pap smear will be done next year.

## 2022-05-26 LAB
25(OH)D3+25(OH)D2 SERPL-MCNC: 79.2 NG/ML (ref 30–100)
ALBUMIN SERPL-MCNC: 4.4 G/DL (ref 3.8–4.8)
ALBUMIN/GLOB SERPL: 1.9 {RATIO} (ref 1.2–2.2)
ALP SERPL-CCNC: 65 IU/L (ref 44–121)
ALT SERPL-CCNC: 11 IU/L (ref 0–32)
APPEARANCE UR: CLEAR
AST SERPL-CCNC: 14 IU/L (ref 0–40)
BACTERIA #/AREA URNS HPF: NORMAL /[HPF]
BASOPHILS # BLD AUTO: 0.1 X10E3/UL (ref 0–0.2)
BASOPHILS NFR BLD AUTO: 1 %
BILIRUB SERPL-MCNC: 0.3 MG/DL (ref 0–1.2)
BILIRUB UR QL STRIP: NEGATIVE
BUN SERPL-MCNC: 13 MG/DL (ref 8–27)
BUN/CREAT SERPL: 15 (ref 12–28)
CALCIUM SERPL-MCNC: 9.4 MG/DL (ref 8.7–10.3)
CASTS URNS QL MICRO: NORMAL /LPF
CHLORIDE SERPL-SCNC: 103 MMOL/L (ref 96–106)
CHOLEST SERPL-MCNC: 162 MG/DL (ref 100–199)
CO2 SERPL-SCNC: 24 MMOL/L (ref 20–29)
COLOR UR: YELLOW
CREAT SERPL-MCNC: 0.89 MG/DL (ref 0.57–1)
EGFRCR SERPLBLD CKD-EPI 2021: 72 ML/MIN/1.73
EOSINOPHIL # BLD AUTO: 0.1 X10E3/UL (ref 0–0.4)
EOSINOPHIL NFR BLD AUTO: 2 %
EPI CELLS #/AREA URNS HPF: NORMAL /HPF (ref 0–10)
ERYTHROCYTE [DISTWIDTH] IN BLOOD BY AUTOMATED COUNT: 12.7 % (ref 11.7–15.4)
GLOBULIN SER CALC-MCNC: 2.3 G/DL (ref 1.5–4.5)
GLUCOSE SERPL-MCNC: 92 MG/DL (ref 65–99)
GLUCOSE UR QL STRIP: NEGATIVE
HCT VFR BLD AUTO: 41.1 % (ref 34–46.6)
HDLC SERPL-MCNC: 68 MG/DL
HGB BLD-MCNC: 13.3 G/DL (ref 11.1–15.9)
HGB UR QL STRIP: NEGATIVE
IMM GRANULOCYTES # BLD AUTO: 0 X10E3/UL (ref 0–0.1)
IMM GRANULOCYTES NFR BLD AUTO: 0 %
KETONES UR QL STRIP: NEGATIVE
LDLC SERPL CALC-MCNC: 85 MG/DL (ref 0–99)
LDLC/HDLC SERPL: 1.3 RATIO (ref 0–3.2)
LEUKOCYTE ESTERASE UR QL STRIP: NEGATIVE
LYMPHOCYTES # BLD AUTO: 1.4 X10E3/UL (ref 0.7–3.1)
LYMPHOCYTES NFR BLD AUTO: 27 %
MCH RBC QN AUTO: 30.8 PG (ref 26.6–33)
MCHC RBC AUTO-ENTMCNC: 32.4 G/DL (ref 31.5–35.7)
MCV RBC AUTO: 95 FL (ref 79–97)
MICRO URNS: NORMAL
MICRO URNS: NORMAL
MONOCYTES # BLD AUTO: 0.4 X10E3/UL (ref 0.1–0.9)
MONOCYTES NFR BLD AUTO: 8 %
NEUTROPHILS # BLD AUTO: 3.2 X10E3/UL (ref 1.4–7)
NEUTROPHILS NFR BLD AUTO: 62 %
NITRITE UR QL STRIP: NEGATIVE
PH UR STRIP: 7 [PH] (ref 5–7.5)
PLATELET # BLD AUTO: 268 X10E3/UL (ref 150–450)
POTASSIUM SERPL-SCNC: 4.2 MMOL/L (ref 3.5–5.2)
PROT SERPL-MCNC: 6.7 G/DL (ref 6–8.5)
PROT UR QL STRIP: NEGATIVE
RBC # BLD AUTO: 4.32 X10E6/UL (ref 3.77–5.28)
RBC #/AREA URNS HPF: NORMAL /HPF (ref 0–2)
SODIUM SERPL-SCNC: 142 MMOL/L (ref 134–144)
SP GR UR STRIP: 1.02 (ref 1–1.03)
T4 FREE SERPL-MCNC: 1.13 NG/DL (ref 0.82–1.77)
TRIGL SERPL-MCNC: 43 MG/DL (ref 0–149)
TSH SERPL DL<=0.005 MIU/L-ACNC: 1.42 UIU/ML (ref 0.45–4.5)
URINALYSIS REFLEX: NORMAL
UROBILINOGEN UR STRIP-MCNC: 0.2 MG/DL (ref 0.2–1)
VLDLC SERPL CALC-MCNC: 9 MG/DL (ref 5–40)
WBC # BLD AUTO: 5.3 X10E3/UL (ref 3.4–10.8)
WBC #/AREA URNS HPF: NORMAL /HPF (ref 0–5)

## 2022-06-03 ENCOUNTER — HOSPITAL ENCOUNTER (OUTPATIENT)
Dept: MAMMOGRAPHY | Facility: HOSPITAL | Age: 62
End: 2022-06-03

## 2022-06-07 ENCOUNTER — HOSPITAL ENCOUNTER (OUTPATIENT)
Dept: MAMMOGRAPHY | Facility: HOSPITAL | Age: 62
Discharge: HOME | End: 2022-06-07
Admitting: INTERNAL MEDICINE

## 2022-06-07 DIAGNOSIS — Z12.31 SCREENING MAMMOGRAM FOR HIGH-RISK PATIENT: ICD-10-CM

## 2022-06-07 PROCEDURE — 77067 SCR MAMMO BI INCL CAD: CPT

## 2022-06-07 PROCEDURE — 77063 BREAST TOMOSYNTHESIS BI: CPT

## 2023-04-05 RX ORDER — RISEDRONATE SODIUM 150 MG/1
TABLET, FILM COATED ORAL
Qty: 4 TABLET | Refills: 3 | Status: SHIPPED | OUTPATIENT
Start: 2023-04-05

## 2023-04-05 NOTE — TELEPHONE ENCOUNTER
Rx Refill Note  Requested Prescriptions     Pending Prescriptions Disp Refills   • risedronate (ACTONEL) 150 MG tablet [Pharmacy Med Name: RISEDRONATE SODIUM 150 MG TAB] 3 tablet      Sig: TAKE 1 TABLET BY MOUTH EVERY 30 DAYS ON AN EMPTY STOMACH BEFORE BREAKFAST. REMAIN UPRIGHT FOR 30 MINUTES AND TAKE WITH 8 OUNCES OF WATER      Last office visit with prescribing clinician: 5/25/2022   Last telemedicine visit with prescribing clinician: 5/31/2023   Next office visit with prescribing clinician: 5/31/2023                         Would you like a call back once the refill request has been completed: [] Yes [] No    If the office needs to give you a call back, can they leave a voicemail: [] Yes [] No    William Howell MA  04/05/23, 08:50 EDT

## 2023-05-31 ENCOUNTER — OFFICE VISIT (OUTPATIENT)
Dept: FAMILY MEDICINE CLINIC | Facility: CLINIC | Age: 63
End: 2023-05-31

## 2023-05-31 VITALS
BODY MASS INDEX: 25.88 KG/M2 | OXYGEN SATURATION: 99 % | DIASTOLIC BLOOD PRESSURE: 88 MMHG | HEIGHT: 64 IN | SYSTOLIC BLOOD PRESSURE: 134 MMHG | WEIGHT: 151.6 LBS | HEART RATE: 66 BPM

## 2023-05-31 DIAGNOSIS — N63.42 SUBAREOLAR MASS OF LEFT BREAST: ICD-10-CM

## 2023-05-31 DIAGNOSIS — R63.5 WEIGHT GAIN: ICD-10-CM

## 2023-05-31 DIAGNOSIS — R92.2 DENSE BREAST TISSUE: ICD-10-CM

## 2023-05-31 DIAGNOSIS — R53.83 FATIGUE, UNSPECIFIED TYPE: ICD-10-CM

## 2023-05-31 DIAGNOSIS — M81.0 ENCOUNTER FOR ONGOING OSTEOPOROSIS THERAPY: ICD-10-CM

## 2023-05-31 DIAGNOSIS — Z82.49 FAMILY HISTORY OF HEART DISEASE: ICD-10-CM

## 2023-05-31 DIAGNOSIS — Z12.4 SCREENING FOR MALIGNANT NEOPLASM OF THE CERVIX: ICD-10-CM

## 2023-05-31 DIAGNOSIS — Z23 ENCOUNTER FOR IMMUNIZATION: ICD-10-CM

## 2023-05-31 DIAGNOSIS — Z12.4 ROUTINE PAPANICOLAOU SMEAR: ICD-10-CM

## 2023-05-31 DIAGNOSIS — Z51.89 ENCOUNTER FOR ONGOING OSTEOPOROSIS THERAPY: ICD-10-CM

## 2023-05-31 DIAGNOSIS — Z01.419 ROUTINE GYNECOLOGICAL EXAMINATION: ICD-10-CM

## 2023-05-31 DIAGNOSIS — Z12.31 ENCOUNTER FOR SCREENING MAMMOGRAM FOR BREAST CANCER: Primary | ICD-10-CM

## 2023-05-31 RX ORDER — DOXYCYCLINE HYCLATE 100 MG
TABLET ORAL
COMMUNITY
Start: 2023-03-09

## 2023-05-31 RX ORDER — CLINDAMYCIN PHOSPHATE 11.9 MG/ML
SOLUTION TOPICAL
COMMUNITY
Start: 2023-04-24

## 2023-05-31 NOTE — PROGRESS NOTES
The ABCs of the Annual Wellness Visit  [place] to Medicare Visit    Subjective     Beronica Vinson is a 63 y.o. female who presents for a  Welcome to Medicare Visit.    The following portions of the patient's history were reviewed and   updated as appropriate: allergies, current medications, past family history, past medical history, past social history, past surgical history and problem list.     Compared to one year ago, the patient feels her physical   health is the same.    Compared to one year ago, the patient feels her mental   health is the same.    Recent Hospitalizations:  She was not admitted to the hospital during the last year.       Current Medical Providers:  Patient Care Team:  Brittany Dyer MD as PCP - General (Internal Medicine)    Outpatient Medications Prior to Visit   Medication Sig Dispense Refill   • clindamycin (CLEOCIN T) 1 % external solution      • doxycycline (VIBRAMYICN) 100 MG tablet      • mupirocin (Bactroban) 2 % ointment Apply  topically to the appropriate area as directed 3 (Three) Times a Day. 30 g 0   • risedronate (ACTONEL) 150 MG tablet TAKE 1 TABLET BY MOUTH EVERY 30 DAYS ON AN EMPTY STOMACH BEFORE BREAKFAST. REMAIN UPRIGHT FOR 30 MINUTES AND TAKE WITH 8 OUNCES OF WATER 4 tablet 3   • multivitamin (THERAGRAN) tablet tablet Take  by mouth Daily.       No facility-administered medications prior to visit.       No opioid medication identified on active medication list. I have reviewed chart for other potential  high risk medication/s and harmful drug interactions in the elderly.          Aspirin is not on active medication list.  Aspirin use is not indicated based on review of current medical condition/s. Risk of harm outweighs potential benefits.  .    There is no problem list on file for this patient.    Advance Care Planning   Advance Care Planning     Advance Directive is not on file.  ACP discussion was held with the patient during this visit. Patient has an advance  "directive (not in EMR), copy requested.       Objective   Vitals:    23 0809   BP: 134/88   BP Location: Left arm   Patient Position: Sitting   Cuff Size: Adult   Pulse: 66   SpO2: 99%   Weight: 68.8 kg (151 lb 9.6 oz)   Height: 162.6 cm (64\")     Estimated body mass index is 26.02 kg/m² as calculated from the following:    Height as of this encounter: 162.6 cm (64\").    Weight as of this encounter: 68.8 kg (151 lb 9.6 oz).    BMI is >= 25 and <30. (Overweight) The following options were offered after discussion;: none (medical contraindication)      Does the patient have evidence of cognitive impairment?   No         Procedures       HEALTH RISK ASSESSMENT    Smoking Status:  Social History     Tobacco Use   Smoking Status Former   • Packs/day: 0.50   • Years: 5.00   • Pack years: 2.50   • Types: Cigarettes   • Start date: 1974   • Quit date: 1979   • Years since quittin.4   Smokeless Tobacco Former   Tobacco Comments    Approx 5 years as a teenager     Alcohol Consumption:  Social History     Substance and Sexual Activity   Alcohol Use Yes   • Alcohol/week: 6.0 - 8.0 standard drinks   • Types: 5 - 6 Glasses of wine, 1 - 2 Cans of beer per week    Comment: socially        Fall Risk Screen:    [facility].    Depression Screen:       2022     8:08 AM   PHQ-2/PHQ-9 Depression Screening   Little Interest or Pleasure in Doing Things 0-->not at all   Feeling Down, Depressed or Hopeless 0-->not at all   PHQ-9: Brief Depression Severity Measure Score 0       Health Habits and Functional and Cognitive Screening:       View : No data to display.                Visual Acuity:    Vision Screening (Inadequate exam)    Right eye Left eye Both eyes   Without correction 20/50 50/40 20/40   With correction      Comments: Did not have glasses with her       Age-appropriate Screening Schedule:  Refer to the list below for future screening recommendations based on " "patient's age, sex and/or medical conditions. Orders for these recommended tests are listed in the plan section. The patient has been provided with a written plan.    Health Maintenance   Topic Date Due   • HEPATITIS C SCREENING  Never done   • INFLUENZA VACCINE  08/01/2023   • DXA SCAN  01/10/2024   • PAP SMEAR  05/05/2024   • ANNUAL WELLNESS VISIT  05/31/2024   • MAMMOGRAM  06/07/2024   • COLORECTAL CANCER SCREENING  11/23/2025   • TDAP/TD VACCINES (2 - Td or Tdap) 08/09/2027   • COVID-19 Vaccine  Completed   • Pneumococcal Vaccine 65+  Completed   • ZOSTER VACCINE  Discontinued        CMS Preventative Services Quick Reference  Risk Factors Identified During Encounter    Immunizations Discussed/Encouraged: Prevnar 20 (Pneumococcal 20-valent conjugate)  Dental Screening Recommended  Vision Screening Recommended  The above risks/problems have been discussed with the patient.  Pertinent information has been shared with the patient in the After Visit Summary.    Follow Up:   Initial Medicare Visit in one year    An After Visit Summary and PPPS were made available to the patient.      Additional E&M Note during same encounter follows:  Patient has multiple medical problems which are significant and separately identifiable that require additional work above and beyond the Medicare Wellness Visit.      Chief Complaint  Annual Exam    Subjective        HPI  Beronica Vinson is also being seen today for AWV and for pap smear and breast exam.  She has    No concerns.  Last pap smear 5/2021 and never had abnormal pap smear.  She is due for MMG this summer.  CN 2015 and repeat in 2025. No change in stools or bowels.  On actonel for OP and tolerating it well .  dexa due in 2024.      DEXA Bone Density Axial (01/10/2022 12:38)    Objective   Vital Signs:  /88 (BP Location: Left arm, Patient Position: Sitting, Cuff Size: Adult)   Pulse 66   Ht 162.6 cm (64\")   Wt 68.8 kg (151 lb 9.6 oz)   SpO2 99%   BMI 26.02 kg/m² "     Physical Exam  Vitals and nursing note reviewed.   Constitutional:       Appearance: She is well-developed.   HENT:      Head: Normocephalic and atraumatic.      Nose: Nose normal.   Eyes:      Conjunctiva/sclera: Conjunctivae normal.   Pulmonary:      Effort: Pulmonary effort is normal. No respiratory distress.   Chest:   Breasts:     Right: No inverted nipple, mass, nipple discharge, skin change or tenderness.      Left: Mass (oval mass medial side of areola; slightly tender) present. No inverted nipple, nipple discharge, skin change or tenderness.       Genitourinary:     Exam position: Knee-chest position.      Labia:         Right: No rash, tenderness, lesion or injury.         Left: No rash, tenderness, lesion or injury.       Vagina: Normal.      Cervix: No cervical motion tenderness, discharge or friability.      Uterus: Not deviated, not enlarged, not fixed and not tender.       Adnexa:         Right: No mass, tenderness or fullness.          Left: No mass, tenderness or fullness.        Rectum: Normal.   Skin:     General: Skin is warm and dry.   Neurological:      Mental Status: She is alert and oriented to person, place, and time.   Psychiatric:         Behavior: Behavior normal.         Thought Content: Thought content normal.         Judgment: Judgment normal.                      Assessment and Plan   Diagnoses and all orders for this visit:    1. Encounter for screening mammogram for breast cancer (Primary)  -     Cancel: Mammo Screening Bilateral With CAD; Future    2. Encounter for immunization  -     Pneumococcal Conjugate Vaccine 20-Valent (PCV20)    3. Fatigue, unspecified type  -     TSH  -     T4, Free  -     Comprehensive Metabolic Panel  -     CBC & Differential    4. Family history of heart disease    5. Weight gain  -     TSH  -     T4, Free  -     Comprehensive Metabolic Panel    6. Encounter for ongoing osteoporosis therapy  -     Vitamin D,25-Hydroxy    7. Dense breast tissue  -      Mammo Diagnostic Bilateral With CAD; Future  -     US breast bilateral complete; Future    8. Subareolar mass of left breast  -     Mammo Diagnostic Bilateral With CAD; Future  -     US breast bilateral complete; Future    9. Routine Papanicolaou smear  -     IGP, Aptima HPV, Rfx 16 / 18,45; Future  -     IGP, Aptima HPV, Rfx 16 / 18,45    10. Routine gynecological examination    11. Screening for malignant neoplasm of the cervix    ThinPrep today which will be her last Pap smear based on new guidelines.  Patient has had normal Pap smears and no new partners.  Diagnostic mammogram and bilateral breast ultrasound due to breast density as well as thickened masslike area medial side of the left areola.  Colonoscopy is due in 2025  Vaccinations reviewed.  Patient will get Prevnar 20 today  Blood work has been ordered.  Welcome to Medicare completed.  Copy of living will requested.  Yearly eye exam recommended and name given for Dr. Juanjose Craig.  She will call and make her appointment.  Dental visits are up-to-date.  She screens negative for falls, glaucoma, hearing loss.  Encouraged exercise on a regular basis to help with bone density.  She is tolerating Actonel well.  DEXA due in 2024.  Patient has gained weight.  Could be related to thyroid disorder.  Check thyroid function.  I will see her back in 1 year or sooner if needed.         Follow Up   No follow-ups on file.  Patient was given instructions and counseling regarding her condition or for health maintenance advice. Please see specific information pulled into the AVS if appropriate.

## 2023-06-01 LAB
25(OH)D3+25(OH)D2 SERPL-MCNC: 54.5 NG/ML (ref 30–100)
ALBUMIN SERPL-MCNC: 4.5 G/DL (ref 3.5–5.2)
ALBUMIN/GLOB SERPL: 2.4 G/DL
ALP SERPL-CCNC: 66 U/L (ref 39–117)
ALT SERPL-CCNC: 12 U/L (ref 1–33)
AST SERPL-CCNC: 16 U/L (ref 1–32)
BASOPHILS # BLD AUTO: 0.05 10*3/MM3 (ref 0–0.2)
BASOPHILS NFR BLD AUTO: 0.9 % (ref 0–1.5)
BILIRUB SERPL-MCNC: 0.3 MG/DL (ref 0–1.2)
BUN SERPL-MCNC: 12 MG/DL (ref 8–23)
BUN/CREAT SERPL: 16 (ref 7–25)
CALCIUM SERPL-MCNC: 9.3 MG/DL (ref 8.6–10.5)
CHLORIDE SERPL-SCNC: 107 MMOL/L (ref 98–107)
CO2 SERPL-SCNC: 26.2 MMOL/L (ref 22–29)
CREAT SERPL-MCNC: 0.75 MG/DL (ref 0.57–1)
EGFRCR SERPLBLD CKD-EPI 2021: 88.5 ML/MIN/1.73
EOSINOPHIL # BLD AUTO: 0.14 10*3/MM3 (ref 0–0.4)
EOSINOPHIL NFR BLD AUTO: 2.5 % (ref 0.3–6.2)
ERYTHROCYTE [DISTWIDTH] IN BLOOD BY AUTOMATED COUNT: 14 % (ref 12.3–15.4)
GLOBULIN SER CALC-MCNC: 1.9 GM/DL
GLUCOSE SERPL-MCNC: 92 MG/DL (ref 65–99)
HCT VFR BLD AUTO: 40.9 % (ref 34–46.6)
HGB BLD-MCNC: 13.2 G/DL (ref 12–15.9)
IMM GRANULOCYTES # BLD AUTO: 0.01 10*3/MM3 (ref 0–0.05)
IMM GRANULOCYTES NFR BLD AUTO: 0.2 % (ref 0–0.5)
LYMPHOCYTES # BLD AUTO: 1.55 10*3/MM3 (ref 0.7–3.1)
LYMPHOCYTES NFR BLD AUTO: 27.3 % (ref 19.6–45.3)
MCH RBC QN AUTO: 29 PG (ref 26.6–33)
MCHC RBC AUTO-ENTMCNC: 32.3 G/DL (ref 31.5–35.7)
MCV RBC AUTO: 89.9 FL (ref 79–97)
MONOCYTES # BLD AUTO: 0.52 10*3/MM3 (ref 0.1–0.9)
MONOCYTES NFR BLD AUTO: 9.2 % (ref 5–12)
NEUTROPHILS # BLD AUTO: 3.41 10*3/MM3 (ref 1.7–7)
NEUTROPHILS NFR BLD AUTO: 59.9 % (ref 42.7–76)
NRBC BLD AUTO-RTO: 0 /100 WBC (ref 0–0.2)
PLATELET # BLD AUTO: 234 10*3/MM3 (ref 140–450)
POTASSIUM SERPL-SCNC: 4.1 MMOL/L (ref 3.5–5.2)
PROT SERPL-MCNC: 6.4 G/DL (ref 6–8.5)
RBC # BLD AUTO: 4.55 10*6/MM3 (ref 3.77–5.28)
SODIUM SERPL-SCNC: 145 MMOL/L (ref 136–145)
T4 FREE SERPL-MCNC: 0.98 NG/DL (ref 0.93–1.7)
TSH SERPL DL<=0.005 MIU/L-ACNC: 2.05 UIU/ML (ref 0.27–4.2)
WBC # BLD AUTO: 5.68 10*3/MM3 (ref 3.4–10.8)

## 2023-06-06 LAB
CYTOLOGIST CVX/VAG CYTO: NORMAL
CYTOLOGY CVX/VAG DOC CYTO: NORMAL
CYTOLOGY CVX/VAG DOC THIN PREP: NORMAL
DX ICD CODE: NORMAL
HIV 1 & 2 AB SER-IMP: NORMAL
HPV GENOTYPE REFLEX: NORMAL
HPV I/H RISK 4 DNA CVX QL PROBE+SIG AMP: NEGATIVE
Lab: NORMAL
OTHER STN SPEC: NORMAL
RECOM F/U CVX/VAG CYTO: NORMAL
STAT OF ADQ CVX/VAG CYTO-IMP: NORMAL

## 2023-10-24 ENCOUNTER — OFFICE VISIT (OUTPATIENT)
Dept: FAMILY MEDICINE CLINIC | Facility: CLINIC | Age: 63
End: 2023-10-24
Payer: MEDICARE

## 2023-10-24 VITALS
BODY MASS INDEX: 25.68 KG/M2 | DIASTOLIC BLOOD PRESSURE: 86 MMHG | HEART RATE: 88 BPM | OXYGEN SATURATION: 99 % | SYSTOLIC BLOOD PRESSURE: 104 MMHG | HEIGHT: 64 IN | TEMPERATURE: 98.4 F | WEIGHT: 150.4 LBS

## 2023-10-24 DIAGNOSIS — N64.59 OTHER SIGNS AND SYMPTOMS IN BREAST: ICD-10-CM

## 2023-10-24 DIAGNOSIS — R92.30 DENSE BREAST TISSUE: Primary | ICD-10-CM

## 2023-10-24 PROBLEM — S86.912A: Status: ACTIVE | Noted: 2023-07-17

## 2023-10-24 PROBLEM — M25.562 ACUTE PAIN OF LEFT KNEE: Status: ACTIVE | Noted: 2023-07-17

## 2023-10-24 PROCEDURE — 1159F MED LIST DOCD IN RCRD: CPT | Performed by: INTERNAL MEDICINE

## 2023-10-24 PROCEDURE — 1160F RVW MEDS BY RX/DR IN RCRD: CPT | Performed by: INTERNAL MEDICINE

## 2023-10-24 PROCEDURE — 99213 OFFICE O/P EST LOW 20 MIN: CPT | Performed by: INTERNAL MEDICINE

## 2023-10-24 RX ORDER — ACYCLOVIR 400 MG/1
TABLET ORAL
COMMUNITY
Start: 2023-10-13

## 2023-10-24 NOTE — PROGRESS NOTES
"Chief Complaint  Follow-up (Breast exam)    Subjective        Beronica Vinson presents to [facility]  History of Present Illness  Patient is here to follow-up on her breast exam on the left side.  She has dense breast tissue and on her physical in May we felt an area in the left breast that was asymmetric.  I ordered diagnostic mammogram and left breast targeted ultrasound both of which were normal/negative.  Radiologist recommended clinical follow-up.  On reexamination of the breast today she does have dense breast tissue with paucity of breast tissue and areas.  He denies family history of breast cancer.  She has no breast pain or breast issues.  No skin issues or nipple trouble.  Objective   Vital Signs:  /86 (BP Location: Left arm, Patient Position: Sitting, Cuff Size: Adult)   Pulse 88   Temp 98.4 °F (36.9 °C)   Ht 162.6 cm (64\")   Wt 68.2 kg (150 lb 6.4 oz)   SpO2 99%   BMI 25.82 kg/m²   Estimated body mass index is 25.82 kg/m² as calculated from the following:    Height as of this encounter: 162.6 cm (64\").    Weight as of this encounter: 68.2 kg (150 lb 6.4 oz).             Physical Exam  Vitals and nursing note reviewed.   Constitutional:       Appearance: Normal appearance.   Chest:   Breasts:     Right: No inverted nipple, mass, nipple discharge, skin change or tenderness.      Left: No inverted nipple, mass, nipple discharge, skin change or tenderness.      Comments: Areas of dense breast tissue in both breast making clinical exam difficult.  Neurological:      General: No focal deficit present.      Mental Status: She is alert and oriented to person, place, and time.   Psychiatric:         Mood and Affect: Mood normal.         Behavior: Behavior normal.         Thought Content: Thought content normal.         Judgment: Judgment normal.        Result Review :                   Assessment and Plan   Diagnoses and all orders for this visit:    1. Dense breast " tissue (Primary)  -     MRI breast bilateral screening w wo contrast; Future    2. Other signs and symptoms in breast  -     MRI breast bilateral screening w wo contrast; Future    I would like for patient to get screening MRI of the breast tissue based on dense breast tissue and asymmetric breast exam.  Breast exam is difficult with the dense breast tissue.  Patient voiced understanding.  I have asked our  to make sure MRI is covered under her insurance plan.         Follow Up   No follow-ups on file.  Patient was given instructions and counseling regarding her condition or for health maintenance advice. Please see specific information pulled into the AVS if appropriate.       Answers submitted by the patient for this visit:  Other (Submitted on 10/23/2023)  Please describe your symptoms.: Followup breast exam  Have you had these symptoms before?: Yes  How long have you been having these symptoms?: Greater than 2 weeks  Please describe any probable cause for these symptoms. : No symptoms  Primary Reason for Visit (Submitted on 10/23/2023)  What is the primary reason for your visit?: Other

## 2023-11-13 ENCOUNTER — HOSPITAL ENCOUNTER (OUTPATIENT)
Facility: HOSPITAL | Age: 63
Discharge: HOME | End: 2023-11-13
Admitting: INTERNAL MEDICINE
Payer: MEDICARE

## 2023-11-13 DIAGNOSIS — R92.30 DENSE BREAST TISSUE: ICD-10-CM

## 2023-11-13 DIAGNOSIS — N64.59 OTHER SIGNS AND SYMPTOMS IN BREAST: ICD-10-CM

## 2023-11-13 PROCEDURE — C8937 CAD BREAST MRI: HCPCS

## 2023-11-13 PROCEDURE — C8908 MRI W/O FOL W/CONT, BREAST,: HCPCS

## 2023-11-13 PROCEDURE — A9577 INJ MULTIHANCE: HCPCS | Performed by: INTERNAL MEDICINE

## 2023-11-13 PROCEDURE — 0 GADOBENATE DIMEGLUMINE 529 MG/ML SOLUTION: Performed by: INTERNAL MEDICINE

## 2023-11-13 RX ADMIN — GADOBENATE DIMEGLUMINE 15 ML: 529 INJECTION, SOLUTION INTRAVENOUS at 17:17

## 2023-11-17 ENCOUNTER — TELEPHONE (OUTPATIENT)
Dept: FAMILY MEDICINE CLINIC | Facility: CLINIC | Age: 63
End: 2023-11-17

## 2023-11-17 DIAGNOSIS — R92.30 DENSE BREAST TISSUE: Primary | ICD-10-CM

## 2023-11-17 DIAGNOSIS — N63.20 MASS OF LEFT BREAST, UNSPECIFIED QUADRANT: ICD-10-CM

## 2023-11-17 DIAGNOSIS — R90.89 ABNORMAL BRAIN MRI: ICD-10-CM

## 2023-11-17 NOTE — TELEPHONE ENCOUNTER
"  Caller: Beronica Vinson \"Kimmy\"    Relationship: Self    Best call back number: [phone]     What is the best time to reach you: ANYTIME    Who are you requesting to speak with (clinical staff, provider,  specific staff member): PCP    Do you know the name of the person who called: MILY NESS    What was the call regarding: PATIENT RETURNING CALL FOR TEST RESULTS     Is it okay if the provider responds through MyChart: NO        "

## 2023-11-21 NOTE — TELEPHONE ENCOUNTER
I called and spoke with pt. Pt advised she got Dr.Perkins GRANADOS and had no further questions . Pt already has her inital appt sched for gen surg

## 2023-12-03 NOTE — PROGRESS NOTES
Assessment/Plan:     63 y.o. female with:    (1) Abnormal breast imaging:  - Bilateral Diagnostic Mammogram, Left Breast US 07/2023 BIRADS 1.   - Bilateral Breast MRI 11/2023 BIRADS 4. Left breast 6:00 2cmfn 0.8cm non-mass enhancement. Recommend further evaluation with an MRI guided left breast biopsy. Order placed. I will call her with the results and make further recommendations at that time.  - Ana Paula Booth lifetime breast cancer risk: will calculate once biopsy results are available    Chief complaint: abnormal breast imaging    HPI: Ms. Beronica Vinson is a 63 year old female here today for evaluation of abnormal breast imaging. She initially saw Dr. Dyer for a wellness visit in 05/2023; on her breast exam, she was noted to have an abnormal area in the left breast. She underwent diagnostic mammogram and US which were negative. She states she doesn't feel anything on her own exam. Denies any breast lumps, pain, skin changes, or nipple discharge. At her follow-up appointment in 10/2023, she was scheduled for a breast MRI due to the asymmetry in the left breast and dense breast tissue. This showed an abnormality and a biopsy was recommended. Her work-up is detailed in the breast history section below. She has had regular annual mammograms. She denies any prior history of abnormal mammograms or breast biopsies. She denies any family history of breast cancer or ovarian cancer.     TIMELINE OF WORKUP:  7/14/2023 Bilateral Diagnostic Mammogram, Left Breast US:  FINDINGS:   The parenchyma of both breasts is largely fatty-replaced. I see no new or dominant masses, areas of architectural distortion or skin thickening. There is no evidence for axillary lymphadenopathy or nipple retraction.  ULTRASOUND: Targeted sonographic evaluation of the left breast was performed through the area of concern in the medial periareolar region of the left breast. No sonographic abnormality is appreciated.  IMPRESSION:  1. There is no  evidence for malignancy or significant change in either breast. No abnormality in the left breast in the area of clinical concern is appreciated. Clinical follow-up is recommended. Routine followup mammography is recommended.  BI-RADS category 1: Negative.    2023 Bilateral Breast MRI:  FINDINGS:   The breasts are largely composed of fatty soft tissue. Mild background parenchymal enhancement of both breasts is noted.  In the anterior one third of the left breast in a retroareolar location at the 6 o'clock position centered on the order of 2 cm from the nipple there is a linear non-mass area of enhancement that is associated with increased T2 signal. The area of enhancement measures on the order of 0.8 cm in anterior to posterior dimension, 0.5 cm in the superior-inferior dimension and 0.7 cm in the medial to lateral dimension. No mammographic abnormality in the region is appreciated.  No other areas of abnormal enhancement or morphology are seen in the left breast. I see no evidence for abnormal left breast skin, nipple or chest wall enhancement and there is no evidence for left axillary or internal mammary chain adenopathy.  There are no areas of abnormal enhancement or morphology in the right breast. I see no evidence for abnormal right breast skin, nipple or chest wall enhancement and there is no evidence for right axillary or internal mammary chain adenopathy.  IMPRESSION:  1. There is a 0.8 cm area of non-mass enhancement in the retroareolar region of the left breast at the 6 o'clock position. No mammographic abnormality in this region is appreciated. Further evaluation with an MRI guided left breast biopsy is recommended.  2. There are no findings suspicious for malignancy in the right breast.  BI-RADS category 4: Suspicious abnormality. Biopsy should be considered.    MEDICAL HISTORY:     Gynecologic History:   . P:2 AB:0  Age at first childbirth: 27  Lactation/How long: yes, 5 months and 8 months  respectively   Age at menarche: 11  Age at menopause: 53  Total years of oral contraceptive use: 10 years  Total years of hormone replacement therapy: 0    Past Medical History:   Past Medical History:   Diagnosis Date    Allergic     Fibrocystic breast     Osteopenia 2019    Palpitations     PONV (postoperative nausea and vomiting)       Past Surgical History:    Past Surgical History:   Procedure Laterality Date    COLONOSCOPY  2018    FRACTURE SURGERY  1974    Broken jaw    KNEE ARTHROSCOPY       Family History:    Family History   Problem Relation Age of Onset    Cancer Mother         Skin    Parkinsonism Mother     Heart disease Father     Heart disease Paternal Grandmother     Stroke Paternal Grandfather     Lung cancer Sister     Cancer Sister         Lung cancer /    Omar Parkinson White syndrome Brother     Omar Parkinson White syndrome Brother      Social History:   Social History     Socioeconomic History    Marital status:    Tobacco Use    Smoking status: Former     Packs/day: 0.50     Years: 5.00     Additional pack years: 0.00     Total pack years: 2.50     Types: Cigarettes     Start date: 1974     Quit date: 1979     Years since quittin.9     Passive exposure: Never    Smokeless tobacco: Former    Tobacco comments:     Approx 5 years as a teenager   Vaping Use    Vaping Use: Never used   Substance and Sexual Activity    Alcohol use: Yes     Alcohol/week: 6.0 - 8.0 standard drinks of alcohol     Types: 5 - 6 Glasses of wine, 1 - 2 Cans of beer per week     Comment: socially     Drug use: Never    Sexual activity: Yes     Partners: Male     Birth control/protection: Post-menopausal     ALLERGIES:   Allergies   Allergen Reactions    Sulfa Antibiotics Hives, Itching and Rash     MEDICATIONS:    Current Outpatient Medications:     clindamycin (CLEOCIN T) 1 % external solution, , Disp: , Rfl:     doxycycline (VIBRAMYICN) 100 MG tablet, , Disp: , Rfl:     Restasis 0.05 % ophthalmic  emulsion, Administer 1 drop to both eyes Every Other Day., Disp: , Rfl:     risedronate (ACTONEL) 150 MG tablet, TAKE 1 TABLET BY MOUTH EVERY 30 DAYS ON AN EMPTY STOMACH BEFORE BREAKFAST. REMAIN UPRIGHT FOR 30 MINUTES AND TAKE WITH 8 OUNCES OF WATER, Disp: 4 tablet, Rfl: 3    acyclovir (ZOVIRAX) 400 MG tablet, , Disp: , Rfl:     mupirocin (Bactroban) 2 % ointment, Apply  topically to the appropriate area as directed 3 (Three) Times a Day. (Patient not taking: Reported on 2023), Disp: 30 g, Rfl: 0    LABS:    Labs from 2023 reviewed.     ROS:   Constitutional: Negative for fevers or chills  HENT: Negative for hearing loss or runny nose  Eyes: Negative for vision changes or scleral icterus  Respiratory: Negative for cough or shortness of breath  Cardiovascular: Negative for chest pain or heart palpitations  Gastrointestinal: Negative for abdominal pain, nausea, vomiting, constipation, melena, or hematochezia  Genitourinary: Negative for hematuria or dysuria  Musculoskeletal: Negative for joint swelling or gait instability  Neurologic: Negative for tremors or seizures  Psychiatric: Negative for suicidal ideations or depression  All other systems reviewed and negative    PHYSICAL EXAM:   ECO - Asymptomatic  Constitutional: Well-developed, well-nourished, no acute distress  Eyes: Conjunctiva normal, sclera nonicteric  ENMT: Hearing grossly normal, oral mucosa moist  Neck: Supple, no palpable mass, trachea midline  Respiratory: Clear to auscultation, normal inspiratory effort  Cardiovascular: Regular rate, no murmur, no peripheral edema, no jugular venous distention  Breast: areas of dense tissue  Right: No visible abnormalities on inspection while seated or with arms raised. No masses, skin changes, or nipple abnormalities.  Left: No visible abnormalities on inspection while seated or with arms raised. No masses, skin changes, or nipple abnormalities.  No clinical chest wall involvement.  Lymphatics  (palpable nodes): No cervical, supraclavicular, or axillary lymphadenopathy  Skin: Warm, dry, no rash on visualized skin surfaces  Musculoskeletal: Symmetric strength, normal gait  Psychiatric: Alert and oriented ×3, normal affect     Jazzy Thorpe PA-C    [facility] - General Surgery   [address], [address]  [place] [address]    [address]  [place] [address]    Office: [phone]  Fax: [phone]

## 2023-12-04 ENCOUNTER — OFFICE VISIT (OUTPATIENT)
Dept: SURGERY | Facility: CLINIC | Age: 63
End: 2023-12-04
Payer: MEDICARE

## 2023-12-04 VITALS
HEIGHT: 64 IN | SYSTOLIC BLOOD PRESSURE: 124 MMHG | DIASTOLIC BLOOD PRESSURE: 82 MMHG | WEIGHT: 153.2 LBS | BODY MASS INDEX: 26.15 KG/M2

## 2023-12-04 DIAGNOSIS — R92.8 ABNORMAL FINDING ON BREAST IMAGING: Primary | ICD-10-CM

## 2023-12-04 PROCEDURE — 99203 OFFICE O/P NEW LOW 30 MIN: CPT

## 2023-12-04 PROCEDURE — 1160F RVW MEDS BY RX/DR IN RCRD: CPT

## 2023-12-04 PROCEDURE — 1159F MED LIST DOCD IN RCRD: CPT

## 2023-12-04 RX ORDER — CYCLOSPORINE 0.5 MG/ML
1 EMULSION OPHTHALMIC EVERY OTHER DAY
COMMUNITY
Start: 2023-11-16

## 2023-12-07 ENCOUNTER — TELEPHONE (OUTPATIENT)
Dept: SURGERY | Facility: CLINIC | Age: 63
End: 2023-12-07
Payer: MEDICARE

## 2023-12-07 NOTE — TELEPHONE ENCOUNTER
SPOKE TO PT REGARDING MRI BREAST BX SCHEDULED ON 12/21 @ 9:45 AM, ARRIVAL 8:45 AM FOR [facility]. INSTRUCTED PT TO WEAR NO METALS TO THE APPT. PT WAS ADDED TO THE WAIT LIST FOR ANY CANCELLATIONS.

## 2023-12-21 ENCOUNTER — HOSPITAL ENCOUNTER (OUTPATIENT)
Dept: MAMMOGRAPHY | Facility: HOSPITAL | Age: 63
Discharge: HOME | End: 2023-12-21
Payer: MEDICARE

## 2023-12-21 ENCOUNTER — HOSPITAL ENCOUNTER (OUTPATIENT)
Dept: ULTRASOUND IMAGING | Facility: HOSPITAL | Age: 63
Discharge: HOME | End: 2023-12-21
Payer: MEDICARE

## 2023-12-21 ENCOUNTER — HOSPITAL ENCOUNTER (OUTPATIENT)
Dept: MRI IMAGING | Facility: HOSPITAL | Age: 63
Discharge: HOME | End: 2023-12-21
Payer: MEDICARE

## 2023-12-21 VITALS
HEIGHT: 64 IN | BODY MASS INDEX: 26.12 KG/M2 | TEMPERATURE: 97.7 F | OXYGEN SATURATION: 100 % | DIASTOLIC BLOOD PRESSURE: 78 MMHG | WEIGHT: 153 LBS | HEART RATE: 70 BPM | RESPIRATION RATE: 16 BRPM | SYSTOLIC BLOOD PRESSURE: 145 MMHG

## 2023-12-21 DIAGNOSIS — R92.8 ABNORMAL FINDING ON BREAST IMAGING: ICD-10-CM

## 2023-12-21 LAB — CREAT BLDA-MCNC: 0.8 MG/DL (ref 0.6–1.3)

## 2023-12-21 PROCEDURE — 76642 ULTRASOUND BREAST LIMITED: CPT

## 2023-12-21 PROCEDURE — A9577 INJ MULTIHANCE: HCPCS

## 2023-12-21 PROCEDURE — C1894 INTRO/SHEATH, NON-LASER: HCPCS

## 2023-12-21 PROCEDURE — 25010000002 LIDOCAINE 1 % SOLUTION

## 2023-12-21 PROCEDURE — 88305 TISSUE EXAM BY PATHOLOGIST: CPT

## 2023-12-21 PROCEDURE — 0 GADOBENATE DIMEGLUMINE 529 MG/ML SOLUTION

## 2023-12-21 PROCEDURE — 82565 ASSAY OF CREATININE: CPT

## 2023-12-21 PROCEDURE — 77065 DX MAMMO INCL CAD UNI: CPT

## 2023-12-21 PROCEDURE — A4648 IMPLANTABLE TISSUE MARKER: HCPCS

## 2023-12-21 RX ORDER — LIDOCAINE HYDROCHLORIDE 10 MG/ML
1 INJECTION, SOLUTION INFILTRATION; PERINEURAL ONCE
Status: COMPLETED | OUTPATIENT
Start: 2023-12-21 | End: 2023-12-21

## 2023-12-21 RX ADMIN — GADOBENATE DIMEGLUMINE 14 ML: 529 INJECTION, SOLUTION INTRAVENOUS at 10:12

## 2023-12-21 RX ADMIN — LIDOCAINE HYDROCHLORIDE 15 ML: 10; .005 INJECTION, SOLUTION EPIDURAL; INFILTRATION; INTRACAUDAL; PERINEURAL at 10:23

## 2023-12-21 RX ADMIN — LIDOCAINE HYDROCHLORIDE 1 ML: 10 INJECTION, SOLUTION INFILTRATION; PERINEURAL at 10:22

## 2023-12-21 NOTE — NURSING NOTE
Biopsy site to left mid slightly outer breast clear with Dermabond dry and intact. Small 2.0 cm x 1.5 cm area of palpable firmness present behind the areola/nipple area over biopsy site. Denies pain. Biopsy clip was not seen on post biopsy imaging, so patient was taken to ultrasound for clip to be reinserted.

## 2023-12-21 NOTE — H&P
Name: Beronica Vinson ADMIT: 2023   : 1960  PCP: Brittany Dyer MD    MRN: [id number] LOS: 0 days   AGE/SEX: 63 y.o. female  ROOM: Room/bed info not found       Chief complaint L breast NME    Present Illness or Internal History:  Patient is a 63 y.o. female presents with L breast NME for MR barajas.     Past Surgical History:  Past Surgical History:   Procedure Laterality Date    COLONOSCOPY  2018    FRACTURE SURGERY      Broken jaw    KNEE ARTHROSCOPY         Past Medical History:  Past Medical History:   Diagnosis Date    Allergic     Fibrocystic breast     Osteopenia 2019    Palpitations     PONV (postoperative nausea and vomiting)        Home Medications:  (Not in a hospital admission)      Allergies:  Sulfa antibiotics    Family History:  Family History   Problem Relation Age of Onset    Cancer Mother         Skin    Parkinsonism Mother     Heart disease Father     Heart disease Paternal Grandmother     Stroke Paternal Grandfather     Lung cancer Sister     Cancer Sister         Lung cancer /    Omar Parkinson White syndrome Brother     Omar Parkinson White syndrome Brother        Social History:  Social History     Tobacco Use    Smoking status: Former     Packs/day: 0.50     Years: 5.00     Additional pack years: 0.00     Total pack years: 2.50     Types: Cigarettes     Start date: 1974     Quit date: 1979     Years since quittin.0     Passive exposure: Never    Smokeless tobacco: Former    Tobacco comments:     Approx 5 years as a teenager   Vaping Use    Vaping Use: Never used   Substance Use Topics    Alcohol use: Yes     Alcohol/week: 6.0 - 8.0 standard drinks of alcohol     Types: 5 - 6 Glasses of wine, 1 - 2 Cans of beer per week     Comment: socially     Drug use: Never        Objective     Physical Exam:    No exam performed today,    Vital Signs  Temp:  [97.7 °F (36.5 °C)] 97.7 °F (36.5 °C)  Heart Rate:  [76] 76  Resp:  [16] 16  BP: (128)/(83)  128/83    Anticipated Surgical Procedure:  Left breast MRI guided core needle biopsy    The risks, benefits and alternatives of this procedure have been discussed with the patient or responsible party: Yes        Elida Worthy MD  12/21/23  09:46 EST

## 2023-12-21 NOTE — NURSING NOTE
Clip successfully replaced in ultrasound. Post clip placement mammo images completed. Ice pack with protective covering applied to biopsy site. Discharge instructions discussed with understanding voiced by patient. Copies provided to patient. No distress noted. To home via private vehicle.

## 2023-12-22 ENCOUNTER — TELEPHONE (OUTPATIENT)
Dept: MAMMOGRAPHY | Facility: HOSPITAL | Age: 63
End: 2023-12-22
Payer: MEDICARE

## 2023-12-22 LAB
LAB AP CASE REPORT: NORMAL
PATH REPORT.FINAL DX SPEC: NORMAL
PATH REPORT.GROSS SPEC: NORMAL

## 2023-12-22 NOTE — TELEPHONE ENCOUNTER
"Patient states, \"I am doing pretty good.\" Patient is using ice as directed. No questions at this time.Patient appreciated call.   "

## 2023-12-26 ENCOUNTER — TELEPHONE (OUTPATIENT)
Dept: SURGERY | Facility: CLINIC | Age: 63
End: 2023-12-26
Payer: MEDICARE

## 2023-12-26 NOTE — TELEPHONE ENCOUNTER
Called patient to discuss recent MRI breast biopsy results.  Pathology benign and concordant.  Recommend routine screening mammogram in July 2024.    She states her postbiopsy hematoma is continuing to resolve, she does have some ecchymosis with minimal discomfort.  Discussed using compression and ice packs as needed.    Encouraged her to call the office with any questions or concerns, changes in breast exam, or any future abnormal imaging.    She verbalized understanding, all questions were answered.    Ana Paula Booth lifetime breast cancer risk: 3.4%. She does not qualify for high risk screening.    12/21/2023 Left Breast MRI Guided Biopsy:  IMPRESSION/RECOMMENDATIONS:  1. MRI guided biopsy of 0.8 cm non-mass enhancement at 6:00 in the anterior left breast, marked by a bowtie clip. Pathology is benign and concordant with the imaging assessment. Recommend annual screening mammogram in July 2024.     Jazzy Thorpe PA-C

## 2024-06-11 ENCOUNTER — HOSPITAL ENCOUNTER (OUTPATIENT)
Dept: GENERAL RADIOLOGY | Facility: HOSPITAL | Age: 64
Discharge: HOME | End: 2024-06-11
Admitting: INTERNAL MEDICINE
Payer: MEDICARE

## 2024-06-11 ENCOUNTER — OFFICE VISIT (OUTPATIENT)
Dept: FAMILY MEDICINE CLINIC | Facility: CLINIC | Age: 64
End: 2024-06-11
Payer: MEDICARE

## 2024-06-11 ENCOUNTER — TRANSCRIBE ORDERS (OUTPATIENT)
Dept: ADMINISTRATIVE | Facility: HOSPITAL | Age: 64
End: 2024-06-11
Payer: MEDICARE

## 2024-06-11 VITALS
DIASTOLIC BLOOD PRESSURE: 84 MMHG | TEMPERATURE: 97.2 F | SYSTOLIC BLOOD PRESSURE: 140 MMHG | OXYGEN SATURATION: 98 % | BODY MASS INDEX: 24.96 KG/M2 | HEART RATE: 61 BPM | WEIGHT: 146.2 LBS | HEIGHT: 64 IN

## 2024-06-11 DIAGNOSIS — Z13.820 SCREENING FOR OSTEOPOROSIS: Primary | ICD-10-CM

## 2024-06-11 DIAGNOSIS — Z12.31 SCREENING MAMMOGRAM FOR BREAST CANCER: ICD-10-CM

## 2024-06-11 DIAGNOSIS — M25.561 ACUTE PAIN OF RIGHT KNEE: ICD-10-CM

## 2024-06-11 DIAGNOSIS — M25.50 ARTHRALGIA, UNSPECIFIED JOINT: ICD-10-CM

## 2024-06-11 DIAGNOSIS — Z12.31 BREAST CANCER SCREENING BY MAMMOGRAM: Primary | ICD-10-CM

## 2024-06-11 DIAGNOSIS — Z78.0 MENOPAUSE: ICD-10-CM

## 2024-06-11 DIAGNOSIS — M81.8 AGE-RELATED OSTEOPOROSIS WITHOUT FRACTURE: ICD-10-CM

## 2024-06-11 PROCEDURE — 73562 X-RAY EXAM OF KNEE 3: CPT

## 2024-06-11 PROCEDURE — 99213 OFFICE O/P EST LOW 20 MIN: CPT | Performed by: INTERNAL MEDICINE

## 2024-06-11 PROCEDURE — G0439 PPPS, SUBSEQ VISIT: HCPCS | Performed by: INTERNAL MEDICINE

## 2024-06-11 PROCEDURE — 1126F AMNT PAIN NOTED NONE PRSNT: CPT | Performed by: INTERNAL MEDICINE

## 2024-06-11 PROCEDURE — 1160F RVW MEDS BY RX/DR IN RCRD: CPT | Performed by: INTERNAL MEDICINE

## 2024-06-11 PROCEDURE — 1170F FXNL STATUS ASSESSED: CPT | Performed by: INTERNAL MEDICINE

## 2024-06-11 PROCEDURE — 1159F MED LIST DOCD IN RCRD: CPT | Performed by: INTERNAL MEDICINE

## 2024-06-11 NOTE — PROGRESS NOTES
The ABCs of the Annual Wellness Visit  Subsequent Medicare Wellness Visit    Subjective    Beronica Vinson is a 64 y.o. female who presents for a Subsequent Medicare Wellness Visit.    The following portions of the patient's history were reviewed and   updated as appropriate: allergies, current medications, past family history, past medical history, past social history, past surgical history, and problem list.    Compared to one year ago, the patient feels her physical   health is the same.    Compared to one year ago, the patient feels her mental   health is the same.    Recent Hospitalizations:  She was not admitted to the hospital during the last year.       Current Medical Providers:  Patient Care Team:  Brittany Dyer MD as PCP - General (Internal Medicine)    Outpatient Medications Prior to Visit   Medication Sig Dispense Refill    acyclovir (ZOVIRAX) 400 MG tablet       clindamycin (CLEOCIN T) 1 % external solution       doxycycline (VIBRAMYICN) 100 MG tablet       mupirocin (Bactroban) 2 % ointment Apply  topically to the appropriate area as directed 3 (Three) Times a Day. 30 g 0    Restasis 0.05 % ophthalmic emulsion Administer 1 drop to both eyes Every Other Day.      risedronate (ACTONEL) 150 MG tablet TAKE 1 TABLET BY MOUTH EVERY 30 DAYS ON AN EMPTY STOMACH BEFORE BREAKFAST. REMAIN UPRIGHT FOR 30 MINUTES AND TAKE WITH 8 OUNCES OF WATER 4 tablet 3     No facility-administered medications prior to visit.       No opioid medication identified on active medication list. I have reviewed chart for other potential  high risk medication/s and harmful drug interactions in the elderly.        Aspirin is not on active medication list.  Aspirin use is not indicated based on review of current medical condition/s. Risk of harm outweighs potential benefits.  .    Patient Active Problem List   Diagnosis    Acute pain of left knee    Muscle strain of knee, left, initial encounter     Advance Care Planning   Advance  "Care Planning     Advance Directive is not on file.  ACP discussion was held with the patient during this visit. Patient has an advance directive (not in EMR), copy requested.     Objective    Vitals:    24 0834   BP: 140/84   BP Location: Left arm   Patient Position: Sitting   Cuff Size: Adult   Pulse: 61   Temp: 97.2 °F (36.2 °C)   SpO2: 98%   Weight: 66.3 kg (146 lb 3.2 oz)   Height: 162.6 cm (64\")     Estimated body mass index is 25.1 kg/m² as calculated from the following:    Height as of this encounter: 162.6 cm (64\").    Weight as of this encounter: 66.3 kg (146 lb 3.2 oz).           Does the patient have evidence of cognitive impairment? No          HEALTH RISK ASSESSMENT    Smoking Status:  Social History     Tobacco Use   Smoking Status Former    Current packs/day: 0.50    Average packs/day: 0.5 packs/day for 48.4 years (24.2 ttl pk-yrs)    Types: Cigarettes    Start date: 1974    Quit date: 1979    Passive exposure: Never   Smokeless Tobacco Former   Tobacco Comments    Approx 5 years as a teenager     Alcohol Consumption:  Social History     Substance and Sexual Activity   Alcohol Use Yes    Alcohol/week: 6.0 - 8.0 standard drinks of alcohol    Types: 5 - 6 Glasses of wine, 1 - 2 Cans of beer per week    Comment: socially      Fall Risk Screen:    ALCIDESADI Fall Risk Assessment was completed, and patient is at LOW risk for falls.Assessment completed on:2024    Depression Screenin/11/2024     8:28 AM   PHQ-2/PHQ-9 Depression Screening   Little Interest or Pleasure in Doing Things 0-->not at all   Feeling Down, Depressed or Hopeless 0-->not at all   PHQ-9: Brief Depression Severity Measure Score 0       Health Habits and Functional and Cognitive Screenin/11/2024     8:32 AM   Functional & Cognitive Status   Do you have difficulty preparing food and eating? No   Do you have difficulty bathing yourself, getting dressed or grooming yourself? No   Do you have difficulty using " the toilet? No   Do you have difficulty moving around from place to place? No   Do you have trouble with steps or getting out of a bed or a chair? No   Current Diet Well Balanced Diet   Dental Exam Up to date   Eye Exam Up to date   Exercise (times per week) 3 times per week   Current Exercises Include Walking   Do you need help using the phone?  No   Are you deaf or do you have serious difficulty hearing?  No   Do you need help to go to places out of walking distance? No   Do you need help shopping? No   Do you need help preparing meals?  No   Do you need help with housework?  No   Do you need help with laundry? No   Do you need help taking your medications? No   Do you need help managing money? No   Do you ever drive or ride in a car without wearing a seat belt? No   Have you felt unusual stress, anger or loneliness in the last month? No   Who do you live with? Spouse   If you need help, do you have trouble finding someone available to you? No   Have you been bothered in the last four weeks by sexual problems? No   Do you have difficulty concentrating, remembering or making decisions? No       Age-appropriate Screening Schedule:  Refer to the list below for future screening recommendations based on patient's age, sex and/or medical conditions. Orders for these recommended tests are listed in the plan section. The patient has been provided with a written plan.    Health Maintenance   Topic Date Due    HEPATITIS C SCREENING  Never done    DXA SCAN  01/10/2024    COVID-19 Vaccine (6 - 2023-24 season) 08/31/2024 (Originally 2/2/2024)    RSV Vaccine - Adults (1 - 1-dose 60+ series) 06/11/2025 (Originally 4/30/2018)    INFLUENZA VACCINE  08/01/2024    BMI FOLLOWUP  12/04/2024    ANNUAL WELLNESS VISIT  06/11/2025    COLORECTAL CANCER SCREENING  11/23/2025    MAMMOGRAM  12/21/2025    PAP SMEAR  05/31/2026    TDAP/TD VACCINES (2 - Td or Tdap) 08/09/2027    Pneumococcal Vaccine 65+  Completed    ZOSTER VACCINE  Discontinued                   CMS Preventative Services Quick Reference  Risk Factors Identified During Encounter  Immunizations Discussed/Encouraged: Pneumococcal 23  Dental Screening Recommended  Vision Screening Recommended  The above risks/problems have been discussed with the patient.  Pertinent information has been shared with the patient in the After Visit Summary.  An After Visit Summary and PPPS were made available to the patient.    Follow Up:   Next Medicare Wellness visit to be scheduled in 1 year.       Additional E&M Note during same encounter follows:  Patient has multiple medical problems which are significant and separately identifiable that require additional work above and beyond the Medicare Wellness Visit.      Chief Complaint  Medicare Wellness-subsequent (Wellness Visit )    Subjective        HPI  Beronica Vinson is also being seen today for AWV and c/o right knee pain medially, seems to be worse after she is active and exercises.  Minimal swelling and no redness.  No buckling.  No injury.  It did seem to start after doing lunges and squats at yoga.  Also c/o bilateral 1st MCP joints are painful and thinks the poses in yoga may also be worsening that.  Patient takes vitamin D and her vitamin D levels have always been normal.  She has no history of hyperlipidemia.  She does take Actonel monthly for osteoporosis.  No previous fractures.  She does occasionally have some heartburn.  She will make note of if it is around the day that she takes the Actonel.  She has some arthralgias at times.  We did investigate that there is a high incidence of arthralgias on Actonel.  She is due for a repeat Pap smear as she had insufficient cells in May 2023.  No previous history of abnormal Pap smears.  She had a breast biopsy on the left done in late 2023 which was negative.  She is due for screening mammogram after July 14.         Objective   Vital Signs:  /84 (BP Location: Left arm, Patient Position: Sitting, Cuff  "Size: Adult)   Pulse 61   Temp 97.2 °F (36.2 °C)   Ht 162.6 cm (64\")   Wt 66.3 kg (146 lb 3.2 oz)   SpO2 98%   BMI 25.10 kg/m²     Physical Exam  Vitals and nursing note reviewed.   Constitutional:       Appearance: Normal appearance. She is well-developed.   HENT:      Head: Normocephalic and atraumatic.      Right Ear: External ear normal.      Left Ear: External ear normal.   Eyes:      Extraocular Movements: Extraocular movements intact.      Conjunctiva/sclera: Conjunctivae normal.   Neck:      Vascular: No carotid bruit.   Cardiovascular:      Rate and Rhythm: Normal rate and regular rhythm.      Heart sounds: Normal heart sounds.      Comments: No bruits  Pulmonary:      Effort: Pulmonary effort is normal. No respiratory distress.      Breath sounds: Normal breath sounds. No stridor. No wheezing, rhonchi or rales.   Chest:      Chest wall: No tenderness.   Abdominal:      General: Bowel sounds are normal. There is no distension.      Palpations: Abdomen is soft. There is no mass.      Tenderness: There is no abdominal tenderness. There is no guarding or rebound.      Hernia: No hernia is present.   Musculoskeletal:      Cervical back: Neck supple.      Right lower leg: No edema.      Left lower leg: No edema.      Comments: She does have crepitus in her right knee.  Medially she has tenderness to palpation and possibly slight fluid.   Lymphadenopathy:      Cervical: No cervical adenopathy.   Skin:     General: Skin is warm.   Neurological:      General: No focal deficit present.      Mental Status: She is alert and oriented to person, place, and time. Mental status is at baseline.   Psychiatric:         Mood and Affect: Mood normal.         Behavior: Behavior normal.         Thought Content: Thought content normal.         Judgment: Judgment normal.                         Assessment and Plan   Diagnoses and all orders for this visit:    1. Screening for osteoporosis (Primary)  -     DEXA Bone Density " Axial; Future    2. Menopause  -     DEXA Bone Density Axial; Future    3. Screening mammogram for breast cancer  -     Mammo Screening Bilateral With CAD; Future    4. Acute pain of right knee  -     XR Knee 3 View Right; Future  -     Ambulatory Referral to Physical Therapy for Evaluation & Treatment  -     CBC & Differential; Future  -     Comprehensive Metabolic Panel; Future  -     TSH; Future  -     T4, Free; Future  -     ANGELA; Future  -     Rheumatoid Factor, Quant; Future  -     Uric acid; Future    5. Age-related osteoporosis without fracture  -     CBC & Differential; Future  -     Comprehensive Metabolic Panel; Future  -     TSH; Future  -     T4, Free; Future    6. Arthralgia, unspecified joint  -     ANGELA; Future  -     Rheumatoid Factor, Quant; Future  -     Uric acid; Future      Will get knee x-ray today.  Will then set her up for physical therapy.  If she is not improving in the next month, will have her see orthopedic.  Will check rheumatoid labs as well as CBC CMP and thyroid.  She will return in a few months for her Pap smear.  Screening mammogram has been ordered.  I did advise that she can stop the Actonel for a few months to see if her arthralgias improve.  I also ordered a bone density which is due.  We reviewed vaccinations.  Preventative counseling provided.  Will also provide a topical pain cream for her to use on her knuckles and her knee through Rx alternatives       Follow Up   No follow-ups on file.  Patient was given instructions and counseling regarding her condition or for health maintenance advice. Please see specific information pulled into the AVS if appropriate.

## 2024-06-24 NOTE — TELEPHONE ENCOUNTER
Rx Refill Note  Requested Prescriptions     Pending Prescriptions Disp Refills    risedronate (ACTONEL) 150 MG tablet [Pharmacy Med Name: RISEDRONATE SODIUM 150 MG TAB] 3 tablet      Sig: TAKE ONE TABLET BY MOUTH ON THE SAME DATE EVERY MONTH ON AN EMPTY STOMACH WITH 8 OUNCES OF WATER. REMAIN UPRIGHT AND DO NOT EAT OR DRINK FOR 60 MINUTES.      Last office visit with prescribing clinician: 6/11/2024   Last telemedicine visit with prescribing clinician: Visit date not found   Next office visit with prescribing clinician: 9/13/2024                         Would you like a call back once the refill request has been completed: [] Yes [] No    If the office needs to give you a call back, can they leave a voicemail: [] Yes [] No    Alvaro Fu MA  06/24/24, 10:44 EDT

## 2024-06-25 RX ORDER — RISEDRONATE SODIUM 150 MG/1
TABLET, FILM COATED ORAL
Qty: 4 TABLET | Refills: 0 | Status: SHIPPED | OUTPATIENT
Start: 2024-06-25

## 2024-07-03 ENCOUNTER — TREATMENT (OUTPATIENT)
Dept: PHYSICAL THERAPY | Facility: CLINIC | Age: 64
End: 2024-07-03
Payer: MEDICARE

## 2024-07-03 DIAGNOSIS — M17.11 PATELLOFEMORAL ARTHRITIS OF RIGHT KNEE: ICD-10-CM

## 2024-07-03 DIAGNOSIS — M25.561 MEDIAL KNEE PAIN, RIGHT: Primary | ICD-10-CM

## 2024-07-03 NOTE — PROGRESS NOTES
Physical Therapy Initial Evaluation and Plan of Care  [address]  [facility] [address]  [phone]    Patient: Beronica Vinson   : 1960  Diagnosis/ICD-10 Code:  Medial knee pain, right [M25.561]  Referring practitioner: Brittany Dyer MD  Date of Initial Visit: 7/3/2024  Today's Date: 2024  Patient seen for 1 session         Visit Diagnoses:    ICD-10-CM ICD-9-CM   1. Medial knee pain, right  M25.561 719.46   2. Patellofemoral arthritis of right knee  M17.11 716.96         Subjective Questionnaire: LEFS: 71      Subjective Evaluation    History of Present Illness  Mechanism of injury: Onset 24 medial R knee pain. Strength training class. Lunges. Maybe wasn't doing correctly  Yoga a couple times a week.  Saw Dr Dyer 24 for wellness visit. Xray R knee and bone scan ordered  Mornings OK. Later is worse, hadye after busy active day. Worse at night with turning over or laying on it.  No clicking or popping.      Patient Occupation: CPA Pain  Location: medial knee R;  Quality: sharp, burning and tight  Relieving factors: change in position, rest and medications (Alleve)  Aggravating factors: repetitive movement and ambulation  Progression: no change    Social Support  Lives with: spouse    Diagnostic Tests  X-ray: abnormal (medial knee)    Treatments  No previous or current treatments  Patient Goals  Patient goals for therapy: decreased pain and return to sport/leisure activities  Patient goal: walk; maybe run a couple days a week           Objective          Active Range of Motion   Left Knee   Flexion: 137 degrees   Extension: Left knee active extension: +2.     Right Knee   Flexion: 132 degrees   Extension: Right knee active extension: +2.     Passive Range of Motion   Left Hip   External rotation (90/90): WFL  Internal rotation (90/90): WFL    Strength/Myotome Testing     Left Hip   Planes of Motion   Flexion: 4+  Abduction: 4+  External rotation: 4  Internal rotation:  5    Right Hip   Planes of Motion   Flexion: 4+  Abduction: 4+  External rotation: 4  Internal rotation: 5    Left Knee   Extension: 5    Right Knee   Extension: 5    Left Ankle/Foot   Dorsiflexion: 5  Plantar flexion: 5    Right Ankle/Foot   Dorsiflexion: 5  Plantar flexion: 5    Tests     Right Knee   Positive patella-femoral grind.   Negative medial Nika, valgus stress test at 0 degrees, valgus stress test at 30 degrees, varus stress test at 0 degrees and varus stress test at 30 degrees.     Functional Assessment   Squat   Left tibial anterior translation beyond toes and right tibial anterior translation beyond toes.     Single Leg Stance   Left: 10 seconds  Right: 10 seconds          Assessment & Plan       Assessment  Impairments: impaired physical strength, lacks appropriate home exercise program and pain with function   Functional limitations: sleeping and walking (Squatting, running)  Assessment details: Pt is a good candidate for skilled PT intervention to restore functional AROM and strength to return to previous level of ADL's.   Crepitus with patellar mobility and lateral tilt of patella  Prognosis: good    Goals  Plan Goals: Short Term Goals (3 weeks)  1. Pt to exhibit less quad  tightness with Daniele test   2. Pt to report less pain with changing positions in bed at night  3. Pt to exhibit 4+/5 hip ER strength to allow for improved hip /knee alignment and improved patellar tracking    Long Term Goals: (6 weeks)  1. Pt to exhibit  normal quad flexibility on Daniele test  2. Pt to report min to no pain with sleeping at night, even after an active day  3. Pt to score >= 75/80 on LEFS  4. Pt to exhibit ability to perform forward and reverse lunges with good control of knee and hip and no pain   5. Pt able to run 1+ mile without knee pain    Plan  Therapy options: will be seen for skilled therapy services  Planned modality interventions: cryotherapy  Planned therapy interventions: balance/weight-bearing  training, body mechanics training, flexibility, home exercise program, joint mobilization, manual therapy, neuromuscular re-education, soft tissue mobilization, strengthening, stretching and therapeutic activities  Frequency: 1x week  Duration in weeks: 6  Treatment plan discussed with: patient            Timed:         Manual Therapy:         mins  85172;     Therapeutic Exercise:    12     mins  11756;     Neuromuscular Warren:        mins  31565;    Therapeutic Activity:          mins  76377;     Gait Training:           mins  17082;     Ultrasound:         mins  94611;    Ionto                                   mins   97162  Self Care                            mins   69032      Un-Timed:  Electrical Stimulation:         mins  63371 ( );  Dry Needling          mins self-pay  Traction          mins 30885  Canalith Repos         mins 35513      Timed Treatment:   12   mins   Total Treatment:     40   mins          PT: Shereen Coronado PT     License Number: [id number]  Electronically signed by Shereen Coronado PT, 07/03/24, 7:50 AM EDT    Certification Period: 7/5/2024 thru 10/2/2024  I certify that the therapy services are furnished while this patient is under my care.  The services outlined above are required by this patient, and will be reviewed every 90 days.         Physician Signature:__________________________________________________    PHYSICIAN: Brittany Dyer MD  NPI: [id number]                                      DATE:      Please sign and return via fax to [email or web address] . Thank you, [facility].

## 2024-07-03 NOTE — PATIENT INSTRUCTIONS
Access Code: [email or web address]  URL: [email or web address]  Date: 07/03/2024  Prepared by: Shereen Coronado    Exercises  - Standing Quadriceps Stretch  - 1 x daily - 7 x weekly - 1 sets - 3 reps - 20 hold  - Clamshell with Resistance  - 1 x daily - 7 x weekly - 3 sets - 15 reps  - Forward Step Down  - 1 x daily - 7 x weekly - 3 sets - 10 reps  - Lateral Step Down  - 1 x daily - 7 x weekly - 3 sets - 10 reps

## 2024-07-11 ENCOUNTER — TREATMENT (OUTPATIENT)
Dept: PHYSICAL THERAPY | Facility: CLINIC | Age: 64
End: 2024-07-11
Payer: MEDICARE

## 2024-07-11 DIAGNOSIS — M25.561 MEDIAL KNEE PAIN, RIGHT: Primary | ICD-10-CM

## 2024-07-11 DIAGNOSIS — M17.11 PATELLOFEMORAL ARTHRITIS OF RIGHT KNEE: ICD-10-CM

## 2024-07-11 NOTE — PROGRESS NOTES
Physical Therapy Daily Treatment Note  [address]  [facility] [address]  [phone]    Patient: Beronica Vinson   : 1960  Referring practitioner: Brittany Dyer MD  Date of Initial Visit: Type: THERAPY  Noted: 7/3/2024  Today's Date: 2024  Patient seen for 2 sessions       Visit Diagnoses:    ICD-10-CM ICD-9-CM   1. Medial knee pain, right  M25.561 719.46   2. Patellofemoral arthritis of right knee  M17.11 716.96       Beronica Vinson reports: Doing well with HEP. Realized I needed       Objective   See Exercise, Manual, and Modality Logs for complete treatment.       Assessment/Plan  Mild soreness medial knee with ant hip glide prone with ER. Improved quad flexibility     Progress per Plan of Care and Progress strengthening /stabilization /functional activity        Timed:         Manual Therapy:    8     mins  70260;     Therapeutic Exercise:    15     mins  12178;     Neuromuscular Warren:        mins  69218;    Therapeutic Activity:          mins  71166;     Gait Training:           mins  91623;     Ultrasound:     8     mins  77684;    Ionto                                   mins   45882  Self Care                            mins   39417      Un-Timed:  Electrical Stimulation:         mins  45295 (MC );  Dry Needling          mins self-pay  Traction          mins 22726  Canalith Repos         mins 85101      Timed Treatment:   31   mins   Total Treatment:     31   mins    Shereen Coronado PT  Physical Therapist  [place] License # [id number]

## 2024-07-17 ENCOUNTER — HOSPITAL ENCOUNTER (OUTPATIENT)
Dept: BONE DENSITY | Facility: HOSPITAL | Age: 64
Discharge: HOME | End: 2024-07-17
Admitting: INTERNAL MEDICINE
Payer: MEDICARE

## 2024-07-17 DIAGNOSIS — Z13.820 SCREENING FOR OSTEOPOROSIS: ICD-10-CM

## 2024-07-17 DIAGNOSIS — Z78.0 MENOPAUSE: ICD-10-CM

## 2024-07-17 PROCEDURE — 77080 DXA BONE DENSITY AXIAL: CPT

## 2024-07-19 ENCOUNTER — TREATMENT (OUTPATIENT)
Dept: PHYSICAL THERAPY | Facility: CLINIC | Age: 64
End: 2024-07-19
Payer: MEDICARE

## 2024-07-19 DIAGNOSIS — M17.11 PATELLOFEMORAL ARTHRITIS OF RIGHT KNEE: ICD-10-CM

## 2024-07-19 DIAGNOSIS — M25.561 MEDIAL KNEE PAIN, RIGHT: Primary | ICD-10-CM

## 2024-07-19 NOTE — PROGRESS NOTES
Physical Therapy Daily Treatment Note  [address]  [facility] [address]  [phone]    Patient: Beronica Vinson   : 1960  Referring practitioner: Brittany Dyer MD  Date of Initial Visit: No linked episodes  Today's Date: 2024  Patient seen for Visit count could not be calculated. Make sure you are using a visit which is associated with an episode. sessions       Visit Diagnoses:    ICD-10-CM ICD-9-CM   1. Medial knee pain, right  M25.561 719.46   2. Patellofemoral arthritis of right knee  M17.11 716.96       Beronica Vinson reports: Feeling a little better. Still sore at night after active day      Objective   See Exercise, Manual, and Modality Logs for complete treatment.       Assessment/Plan  Improving hip and quad strength. Full PROM without pain    Progress per Plan of Care        Timed:         Manual Therapy:    8     mins  34425;     Therapeutic Exercise:     20    mins  50590;     Neuromuscular Warren:        mins  02204;    Therapeutic Activity:          mins  20662;     Gait Training:           mins  62519;     Ultrasound:     8     mins  03317;    Ionto                                   mins   25548  Self Care                            mins   15134      Un-Timed:  Electrical Stimulation:         mins  02305 ( );  Dry Needling          mins self-pay  Traction          mins 21967  Canalith Repos         mins 16044      Timed Treatment:   36   mins   Total Treatment:     36   mins    Shereen Coronado PT  Physical Therapist  [place] License # [id number]

## 2024-07-26 ENCOUNTER — TREATMENT (OUTPATIENT)
Dept: PHYSICAL THERAPY | Facility: CLINIC | Age: 64
End: 2024-07-26
Payer: MEDICARE

## 2024-07-26 DIAGNOSIS — M25.561 MEDIAL KNEE PAIN, RIGHT: Primary | ICD-10-CM

## 2024-07-26 DIAGNOSIS — M17.11 PATELLOFEMORAL ARTHRITIS OF RIGHT KNEE: ICD-10-CM

## 2024-07-26 PROCEDURE — 97140 MANUAL THERAPY 1/> REGIONS: CPT | Performed by: PHYSICAL THERAPIST

## 2024-07-26 PROCEDURE — 97110 THERAPEUTIC EXERCISES: CPT | Performed by: PHYSICAL THERAPIST

## 2024-07-26 NOTE — PROGRESS NOTES
Physical Therapy Progress Note  [address]  [facility] [address]  [phone]    Patient: Beronica Vinson   : 1960  Referring practitioner: Brittany Dyer MD  Date of Initial Visit: Type: THERAPY  Noted: 7/3/2024  Today's Date: 2024  Patient seen for 4 sessions       Visit Diagnoses:    ICD-10-CM ICD-9-CM   1. Medial knee pain, right  M25.561 719.46   2. Patellofemoral arthritis of right knee  M17.11 716.96       Beronica Vinson reports: Knee improving.       Objective   Right Knee   Flexion: 137 degrees     Hip ER strength R: 5/5    See Exercise, Manual, and Modality Logs for complete treatment.       Assessment/Plan  Improving strength and stability. Progress to retro lunge with ball at knee /wall for hip ABD/ER stability. Also trial of Ktape    Short Term Goals (3 weeks) MET  1. Pt to exhibit less quad  tightness with Emma test   2. Pt to report less pain with changing positions in bed at night  3. Pt to exhibit 4+/5 hip ER strength to allow for improved hip /knee alignment and improved patellar tracking    Progress per Plan of Care        Timed:         Manual Therapy:    8     mins  71152;     Therapeutic Exercise:    10     mins  85728;     Neuromuscular Warren:    8   mins  67000;    Therapeutic Activity:          mins  88054;     Gait Training:           mins  79011;     Ultrasound:     8     mins  11673;    Ionto                                   mins   03448  Self Care                            mins   05998      Un-Timed:  Electrical Stimulation:         mins  88994 ( );  Dry Needling          mins self-pay  Traction          mins 23229  Canalith Repos         mins 26387      Timed Treatment:   36   mins   Total Treatment:     36   mins    Shereen Coronado, PT  Physical Therapist  [place] License # [id number]

## 2024-08-02 ENCOUNTER — TREATMENT (OUTPATIENT)
Dept: PHYSICAL THERAPY | Facility: CLINIC | Age: 64
End: 2024-08-02
Payer: MEDICARE

## 2024-08-02 DIAGNOSIS — M17.11 PATELLOFEMORAL ARTHRITIS OF RIGHT KNEE: ICD-10-CM

## 2024-08-02 DIAGNOSIS — M25.561 MEDIAL KNEE PAIN, RIGHT: Primary | ICD-10-CM

## 2024-08-02 PROCEDURE — 97140 MANUAL THERAPY 1/> REGIONS: CPT | Performed by: PHYSICAL THERAPIST

## 2024-08-02 PROCEDURE — 97110 THERAPEUTIC EXERCISES: CPT | Performed by: PHYSICAL THERAPIST

## 2024-08-02 NOTE — PROGRESS NOTES
Physical Therapy DIscharge Note  [address]  [facility] [address]  [phone]    Patient: Beronica Vinson   : 1960  Referring practitioner: Brittany Dyer MD  Date of Initial Visit: Type: THERAPY  Noted: 7/3/2024  Today's Date: 2024  Patient seen for 5 sessions       Visit Diagnoses:    ICD-10-CM ICD-9-CM   1. Medial knee pain, right  M25.561 719.46   2. Patellofemoral arthritis of right knee  M17.11 716.96       Beronica Vinson reports: I am doing really well. Some int soreness at night but doing well with HEP      Objective   Active Range of Motion   Left Knee   Flexion: 137 degrees   Extension: Left knee active extension: +2.      Right Knee   Flexion: 137 degrees   Extension: Right knee active extension: +2.     Strength/Myotome Testing      Left Hip   Planes of Motion   Flexion: 5  Abduction: 5  External rotation: 5  Internal rotation: 5     Right Hip   Planes of Motion   Flexion: 5  Abduction: 5  External rotation: 5  Internal rotation: 5  See Exercise, Manual, and Modality Logs for complete treatment.       Assessment/Plan  Short Term Goals (3 weeks) MET  1. Pt to exhibit less quad  tightness with Daniele test   2. Pt to report less pain with changing positions in bed at night  3. Pt to exhibit 4+/5 hip ER strength to allow for improved hip /knee alignment and improved patellar tracking     Long Term Goals: (6 weeks)  1. Pt to exhibit  normal quad flexibility on Daniele test MET  2. Pt to report min to no pain with sleeping at night, even after an active day MET  3. Pt to score >= 75/80 on LEFS NT  4. Pt to exhibit ability to perform forward and reverse lunges with good control of knee and hip and no pain MET  5. Pt able to run 1+ mile without knee pain NT    Other  DC PT      Timed:         Manual Therapy:    10     mins  34922;     Therapeutic Exercise:    15     mins  76448;     Neuromuscular Warren:        mins  33009;    Therapeutic Activity:          mins  10750;     Gait  Training:           mins  44961;     Ultrasound:          mins  37139;    Ionto                                   mins   12474  Self Care                            mins   67383      Un-Timed:  Electrical Stimulation:         mins  83177 ( );  Dry Needling          mins self-pay  Traction          mins 31628  Canalith Repos         mins 06072      Timed Treatment:   25   mins   Total Treatment:     25   mins    Shereen Coronado PT  Physical Therapist  [place] License # [id number]

## 2024-08-26 ENCOUNTER — HOSPITAL ENCOUNTER (OUTPATIENT)
Dept: MAMMOGRAPHY | Facility: HOSPITAL | Age: 64
Discharge: HOME | End: 2024-08-26
Admitting: INTERNAL MEDICINE
Payer: MEDICARE

## 2024-08-26 DIAGNOSIS — Z12.31 BREAST CANCER SCREENING BY MAMMOGRAM: ICD-10-CM

## 2024-08-26 PROCEDURE — 77067 SCR MAMMO BI INCL CAD: CPT

## 2024-08-26 PROCEDURE — 77063 BREAST TOMOSYNTHESIS BI: CPT

## 2024-09-10 DIAGNOSIS — M25.561 ACUTE PAIN OF RIGHT KNEE: ICD-10-CM

## 2024-09-10 DIAGNOSIS — M25.50 ARTHRALGIA, UNSPECIFIED JOINT: ICD-10-CM

## 2024-09-10 DIAGNOSIS — M81.8 AGE-RELATED OSTEOPOROSIS WITHOUT FRACTURE: ICD-10-CM

## 2024-09-11 LAB
ALBUMIN SERPL-MCNC: 4.4 G/DL (ref 3.5–5.2)
ALBUMIN/GLOB SERPL: 2.1 G/DL
ALP SERPL-CCNC: 71 U/L (ref 39–117)
ALT SERPL-CCNC: 13 U/L (ref 1–33)
ANA SER QL: NEGATIVE
AST SERPL-CCNC: 17 U/L (ref 1–32)
BASOPHILS # BLD AUTO: 0.03 10*3/MM3 (ref 0–0.2)
BASOPHILS NFR BLD AUTO: 0.5 % (ref 0–1.5)
BILIRUB SERPL-MCNC: 0.3 MG/DL (ref 0–1.2)
BUN SERPL-MCNC: 16 MG/DL (ref 8–23)
BUN/CREAT SERPL: 20.5 (ref 7–25)
CALCIUM SERPL-MCNC: 9.3 MG/DL (ref 8.6–10.5)
CHLORIDE SERPL-SCNC: 105 MMOL/L (ref 98–107)
CO2 SERPL-SCNC: 26.7 MMOL/L (ref 22–29)
CREAT SERPL-MCNC: 0.78 MG/DL (ref 0.57–1)
EGFRCR SERPLBLD CKD-EPI 2021: 83.9 ML/MIN/1.73
EOSINOPHIL # BLD AUTO: 0.12 10*3/MM3 (ref 0–0.4)
EOSINOPHIL NFR BLD AUTO: 2.1 % (ref 0.3–6.2)
ERYTHROCYTE [DISTWIDTH] IN BLOOD BY AUTOMATED COUNT: 14.5 % (ref 12.3–15.4)
GLOBULIN SER CALC-MCNC: 2.1 GM/DL
GLUCOSE SERPL-MCNC: 91 MG/DL (ref 65–99)
HCT VFR BLD AUTO: 41.9 % (ref 34–46.6)
HGB BLD-MCNC: 13.8 G/DL (ref 12–15.9)
IMM GRANULOCYTES # BLD AUTO: 0.01 10*3/MM3 (ref 0–0.05)
IMM GRANULOCYTES NFR BLD AUTO: 0.2 % (ref 0–0.5)
LYMPHOCYTES # BLD AUTO: 1.33 10*3/MM3 (ref 0.7–3.1)
LYMPHOCYTES NFR BLD AUTO: 23.5 % (ref 19.6–45.3)
MCH RBC QN AUTO: 29.7 PG (ref 26.6–33)
MCHC RBC AUTO-ENTMCNC: 32.9 G/DL (ref 31.5–35.7)
MCV RBC AUTO: 90.3 FL (ref 79–97)
MONOCYTES # BLD AUTO: 0.5 10*3/MM3 (ref 0.1–0.9)
MONOCYTES NFR BLD AUTO: 8.8 % (ref 5–12)
NEUTROPHILS # BLD AUTO: 3.68 10*3/MM3 (ref 1.7–7)
NEUTROPHILS NFR BLD AUTO: 64.9 % (ref 42.7–76)
NRBC BLD AUTO-RTO: 0 /100 WBC (ref 0–0.2)
PLATELET # BLD AUTO: 237 10*3/MM3 (ref 140–450)
POTASSIUM SERPL-SCNC: 4 MMOL/L (ref 3.5–5.2)
PROT SERPL-MCNC: 6.5 G/DL (ref 6–8.5)
RBC # BLD AUTO: 4.64 10*6/MM3 (ref 3.77–5.28)
RHEUMATOID FACT SERPL-ACNC: <10 IU/ML
SODIUM SERPL-SCNC: 142 MMOL/L (ref 136–145)
T4 FREE SERPL-MCNC: 1.07 NG/DL (ref 0.92–1.68)
TSH SERPL DL<=0.005 MIU/L-ACNC: 1.38 UIU/ML (ref 0.27–4.2)
URATE SERPL-MCNC: 3.4 MG/DL (ref 2.4–5.7)
WBC # BLD AUTO: 5.67 10*3/MM3 (ref 3.4–10.8)

## 2024-09-13 ENCOUNTER — OFFICE VISIT (OUTPATIENT)
Dept: FAMILY MEDICINE CLINIC | Facility: CLINIC | Age: 64
End: 2024-09-13
Payer: MEDICARE

## 2024-09-13 VITALS
RESPIRATION RATE: 18 BRPM | WEIGHT: 147 LBS | HEART RATE: 75 BPM | DIASTOLIC BLOOD PRESSURE: 60 MMHG | SYSTOLIC BLOOD PRESSURE: 102 MMHG | OXYGEN SATURATION: 97 % | BODY MASS INDEX: 25.1 KG/M2 | HEIGHT: 64 IN

## 2024-09-13 DIAGNOSIS — R87.615 ENCOUNTER FOR REPEAT PAP SMEAR DUE TO PREVIOUS INSUFFICIENT CERVICAL CELLS: ICD-10-CM

## 2024-09-13 DIAGNOSIS — Z01.419 ENCOUNTER FOR GYNECOLOGICAL EXAMINATION: Primary | ICD-10-CM

## 2024-09-13 DIAGNOSIS — Z01.419 ENCOUNTER FOR CERVICAL PAP SMEAR WITH PELVIC EXAM: ICD-10-CM

## 2024-09-13 NOTE — PROGRESS NOTES
"Chief Complaint  Gynecologic Exam (Pap smear )    Subjective        Beronica Vinson presents to [facility]    History of Present Illness  The patient is a female who presents for a Pap smear and mammogram.    She reports no history of abnormal Pap smears or postmenopausal bleeding.  Today we are repeating Pap smear because Pap smear in 2023 did not have enough cellularity for appropriate reading.  She does not experience any pelvic issues, pain, or discomfort. She has not undergone any procedures on her cervix and has given birth to two children vaginally. She does not experience any pain or pressure during intercourse. She does not feel any fullness in her bladder and has no issues with urination.    Her cholesterol levels have consistently been within the normal range, and she has no history of high cholesterol. She has never been diagnosed with vitamin D deficiency. She is currently taking vitamin D and calcium supplements.    She has been taking Actonel once a month for 2 to 3 years but discontinued its use for 3 months due to heartburn, which has since improved. She experienced significant achiness. Inflammatory labs neg.  ? Actonel causing the arthralgias.  Thinks exercise would help her    Neg breast bx 12/2023;  neg MMG 8/2024       Objective   Vital Signs:  /60   Pulse 75   Resp 18   Ht 162.6 cm (64\")   Wt 66.7 kg (147 lb)   SpO2 97%   BMI 25.23 kg/m²   Estimated body mass index is 25.23 kg/m² as calculated from the following:    Height as of this encounter: 162.6 cm (64\").    Weight as of this encounter: 66.7 kg (147 lb).        Uric acid (09/10/2024 08:37)  Rheumatoid Factor, Quant (09/10/2024 08:37)  ANGELA (09/10/2024 08:37)  T4, Free (09/10/2024 08:37)  TSH (09/10/2024 08:37)  Comprehensive Metabolic Panel (09/10/2024 08:37)  CBC & Differential (09/10/2024 08:37)    Physical Exam  Vitals and nursing note reviewed.   Constitutional:       Appearance: Normal " appearance. She is well-developed and normal weight.   HENT:      Head: Normocephalic and atraumatic.   Neck:      Thyroid: No thyromegaly.   Pulmonary:      Effort: Pulmonary effort is normal.   Chest:   Breasts:     Right: No inverted nipple, mass, nipple discharge, skin change or tenderness.      Left: No inverted nipple, mass, nipple discharge, skin change or tenderness.   Genitourinary:     Exam position: Supine.      Labia:         Right: No rash or lesion.         Left: No rash or lesion.       Vagina: Normal. No vaginal discharge, erythema, tenderness or bleeding.      Cervix: No cervical motion tenderness, discharge or friability.      Uterus: Not enlarged and not tender.       Adnexa:         Right: No mass, tenderness or fullness.          Left: No mass, tenderness or fullness.     Skin:     General: Skin is warm.      Findings: No rash.   Neurological:      General: No focal deficit present.      Mental Status: She is alert and oriented to person, place, and time. Mental status is at baseline.   Psychiatric:         Mood and Affect: Mood normal.         Behavior: Behavior normal.         Thought Content: Thought content normal.         Judgment: Judgment normal.        Result Review :                Assessment and Plan   Diagnoses and all orders for this visit:    1. Encounter for gynecological examination (Primary)  -     IgP, Aptima HPV; Future    2. Encounter for cervical Pap smear with pelvic exam    3. Encounter for repeat Pap smear due to previous insufficient cervical cells             Assessment & Plan  1. Health Maintenance.  Her mammogram from 08/2024 was negative. Blood count, CBC, chemistries, sugar, kidney function, electrolytes, and liver function are all within normal limits. Thyroid function tests, including TSH and free T4, are normal. ANGELA and rheumatoid factor are negative, indicating no signs of lupus or rheumatoid arthritis. Uric acid level is also negative, ruling out gout. Vitamin D  levels have been consistently normal over the past three years. However, bone density scan from 07/2024 shows some bone loss. Given her age of 64, the risk of developing cervical cancer is low. A Pap smear with HPV test will be ordered. She is advised to continue her vitamin D and calcium supplements.    2. Osteoporosis.  Prolia injections twice a year have been recommended. The potential side effects, risks, and benefits of Prolia were discussed. She will confirm insurance coverage for Prolia and communicate the outcome via [employer].  Continue exercising with weight bearing activities.    3. Uterine Prolapse.  Her uterus has dropped slightly, but she reports no significant pain, pressure, or discomfort during intercourse. She does not experience any urinary symptoms. Monitoring will continue, and if symptoms develop, a referral to a urogynecologist or gynecologist will be made to discuss surgical options.           Follow Up   No follow-ups on file.  Patient was given instructions and counseling regarding her condition or for health maintenance advice. Please see specific information pulled into the AVS if appropriate.         Patient or patient representative verbalized consent for the use of Ambient Listening during the visit with  Brittany Dyer MD for chart documentation. 9/13/2024  13:01 EDT    Brittany Dyer MD   12:59 EDT   [unfilled]

## 2024-09-17 LAB
CYTOLOGIST CVX/VAG CYTO: NORMAL
CYTOLOGY CVX/VAG DOC CYTO: NORMAL
CYTOLOGY CVX/VAG DOC THIN PREP: NORMAL
DX ICD CODE: NORMAL
HPV I/H RISK 4 DNA CVX QL PROBE+SIG AMP: NEGATIVE
Lab: NORMAL
OTHER STN SPEC: NORMAL
STAT OF ADQ CVX/VAG CYTO-IMP: NORMAL

## 2024-09-23 RX ORDER — RISEDRONATE SODIUM 150 MG/1
TABLET, FILM COATED ORAL
Qty: 6 TABLET | Refills: 0 | Status: SHIPPED | OUTPATIENT
Start: 2024-09-23

## 2025-06-02 ENCOUNTER — TELEPHONE (OUTPATIENT)
Dept: FAMILY MEDICINE CLINIC | Facility: CLINIC | Age: 65
End: 2025-06-02

## 2025-06-02 ENCOUNTER — OFFICE VISIT (OUTPATIENT)
Dept: FAMILY MEDICINE CLINIC | Facility: CLINIC | Age: 65
End: 2025-06-02
Payer: MEDICARE

## 2025-06-02 VITALS
TEMPERATURE: 98.2 F | HEIGHT: 64 IN | SYSTOLIC BLOOD PRESSURE: 128 MMHG | BODY MASS INDEX: 25.25 KG/M2 | DIASTOLIC BLOOD PRESSURE: 70 MMHG | HEART RATE: 75 BPM | OXYGEN SATURATION: 97 % | WEIGHT: 147.9 LBS | RESPIRATION RATE: 16 BRPM

## 2025-06-02 DIAGNOSIS — N31.8 FREQUENCY-URGENCY SYNDROME: Primary | ICD-10-CM

## 2025-06-02 DIAGNOSIS — R30.0 DYSURIA: ICD-10-CM

## 2025-06-02 LAB
BILIRUB BLD-MCNC: NEGATIVE MG/DL
CLARITY, POC: CLEAR
COLOR UR: YELLOW
EXPIRATION DATE: ABNORMAL
GLUCOSE UR STRIP-MCNC: NEGATIVE MG/DL
KETONES UR QL: NEGATIVE
LEUKOCYTE EST, POC: ABNORMAL
Lab: ABNORMAL
NITRITE UR-MCNC: NEGATIVE MG/ML
PH UR: 6 [PH] (ref 5–8)
PROT UR STRIP-MCNC: NEGATIVE MG/DL
RBC # UR STRIP: NEGATIVE /UL
SP GR UR: 1.01 (ref 1–1.03)
UROBILINOGEN UR QL: ABNORMAL

## 2025-06-02 PROCEDURE — 1126F AMNT PAIN NOTED NONE PRSNT: CPT | Performed by: FAMILY MEDICINE

## 2025-06-02 PROCEDURE — 99213 OFFICE O/P EST LOW 20 MIN: CPT | Performed by: FAMILY MEDICINE

## 2025-06-02 PROCEDURE — 81003 URINALYSIS AUTO W/O SCOPE: CPT | Performed by: FAMILY MEDICINE

## 2025-06-02 PROCEDURE — 1160F RVW MEDS BY RX/DR IN RCRD: CPT | Performed by: FAMILY MEDICINE

## 2025-06-02 PROCEDURE — 1159F MED LIST DOCD IN RCRD: CPT | Performed by: FAMILY MEDICINE

## 2025-06-02 RX ORDER — NITROFURANTOIN 25; 75 MG/1; MG/1
100 CAPSULE ORAL 2 TIMES DAILY
Qty: 14 CAPSULE | Refills: 0 | Status: SHIPPED | OUTPATIENT
Start: 2025-06-02

## 2025-06-02 RX ORDER — PHENAZOPYRIDINE HYDROCHLORIDE 200 MG/1
200 TABLET, FILM COATED ORAL 3 TIMES DAILY PRN
Qty: 6 TABLET | Refills: 0 | Status: SHIPPED | OUTPATIENT
Start: 2025-06-02

## 2025-06-02 NOTE — TELEPHONE ENCOUNTER
"    Hub staff attempted to follow warm transfer process and was unsuccessful     Caller: Beronica Vinson \"Kimmy\"    Relationship to patient: Self    Best call back number: [phone]    Patient is needing: PATIENT IS HAVING BURNING AND URGENT NEED TO URINATE AND WOULD LIKE TO HAVE URINE SAMPLE TO TEST FOR UTI. HUB UNABLE TO SCHEDULE SAME DAY VISIT, AND UNABLE TO WARM TRANSFER. PLEASE ADVISE.         "

## 2025-06-02 NOTE — PROGRESS NOTES
"Chief Complaint  urinary burning (Off and on for a couple weeks now. Does drink a lot of water, gets less with more water but comes right back), Itching, Urinary Frequency, and Urinary Urgency    Subjective        Beronica Vinson presents to [facility]  Itching  Urinary Frequency  Associated symptoms: frequency        History of Present Illness  The patient presents with complaint of urinary frequency and burning.  She is having the symptoms on and off for last 2 weeks.    She reports experiencing intermittent dysuria and increased urinary frequency over the past few weeks. She has been managing these symptoms by increasing her water intake, which appears to alleviate the burning sensation. However, a reduction in water consumption leads to a recurrence of the symptoms. She reports no associated abdominal pain, nausea, vomiting, or fever. Additionally, she reports no vaginal discharge. Her current medication regimen includes Fosamax, which she takes monthly for osteoporosis. She is not diabetic and does not take any blood pressure medicine. She has a documented allergy to sulfa medications.       Objective   Vital Signs:  /70 (BP Location: Left arm, Patient Position: Sitting, Cuff Size: Adult)   Pulse 75   Temp 98.2 °F (36.8 °C) (Oral)   Resp 16   Ht 162.6 cm (64\")   Wt 67.1 kg (147 lb 14.4 oz)   SpO2 97%   BMI 25.39 kg/m²   Estimated body mass index is 25.39 kg/m² as calculated from the following:    Height as of this encounter: 162.6 cm (64\").    Weight as of this encounter: 67.1 kg (147 lb 14.4 oz).               Physical Exam  Constitutional:       General: She is not in acute distress.     Appearance: Normal appearance. She is well-developed.   HENT:      Head: Normocephalic and atraumatic.      Right Ear: Tympanic membrane normal.      Left Ear: Tympanic membrane normal.      Mouth/Throat:      Mouth: Mucous membranes are moist.   Eyes:      General:         Right " eye: No discharge.         Left eye: No discharge.      Extraocular Movements: Extraocular movements intact.      Pupils: Pupils are equal, round, and reactive to light.   Cardiovascular:      Rate and Rhythm: Normal rate and regular rhythm.      Pulses: Normal pulses.      Heart sounds: Normal heart sounds.   Pulmonary:      Effort: Pulmonary effort is normal.      Breath sounds: Normal breath sounds. No wheezing or rales.   Abdominal:      General: Bowel sounds are normal.      Palpations: Abdomen is soft. There is no mass.      Tenderness: There is no abdominal tenderness. There is no right CVA tenderness or left CVA tenderness.   Musculoskeletal:      Cervical back: Normal range of motion and neck supple.      Right lower leg: No edema.      Left lower leg: No edema.   Lymphadenopathy:      Cervical: No cervical adenopathy.   Neurological:      General: No focal deficit present.      Mental Status: She is alert and oriented to person, place, and time.        Result Review :                   Assessment and Plan   Diagnoses and all orders for this visit:    1. Frequency-urgency syndrome (Primary)  -     POCT urinalysis dipstick, automated  -     Cancel: Urine Culture - Urine, Urine, Clean Catch; Future  -     Urine Culture - Urine, Urine, Random Void    2. Dysuria  -     nitrofurantoin, macrocrystal-monohydrate, (Macrobid) 100 MG capsule; Take 1 capsule by mouth 2 (Two) Times a Day.  Dispense: 14 capsule; Refill: 0  -     Cancel: Urine Culture - Urine, Urine, Clean Catch; Future  -     Urine Culture - Urine, Urine, Random Void    Other orders  -     phenazopyridine (Pyridium) 200 MG tablet; Take 1 tablet by mouth 3 (Three) Times a Day As Needed for Bladder Spasms.  Dispense: 6 tablet; Refill: 0        Assessment & Plan  1. Urinary Tract Infection (UTI).  - Reports burning and increased frequency of urination for the past couple of weeks. No abdominal pain, nausea, vomiting, or fever.  Urine dip done in the office  positive for leukocytes.  Will send for urine culture.  -I will start her on Macrobid  If the culture results are negative,. Encouraged to consume cranberry juice. A prescription for Azo will be provided, to be taken every 8 hours as needed for bladder spasms.. Informed that Azo may cause her urine to appear red or orange.  This was my first encounter with patient       Follow Up   There are no Patient Instructions on file for this visit.   No follow-ups on file.  Patient was given instructions and counseling regarding her condition or for health maintenance advice. Please see specific information pulled into the AVS if appropriate.     Patient or patient representative verbalized consent for the use of Ambient Listening during the visit with  Alejandra Bagley MD for chart documentation. 6/2/2025  16:29 EDT

## 2025-06-02 NOTE — TELEPHONE ENCOUNTER
Called and scheduled patient for ov for possible UTI symptoms   -continue home amlodipine 10mg po daily

## 2025-06-06 ENCOUNTER — RESULTS FOLLOW-UP (OUTPATIENT)
Dept: FAMILY MEDICINE CLINIC | Facility: CLINIC | Age: 65
End: 2025-06-06
Payer: MEDICARE

## 2025-06-06 DIAGNOSIS — R30.0 DYSURIA: ICD-10-CM

## 2025-06-06 DIAGNOSIS — N31.8 FREQUENCY-URGENCY SYNDROME: Primary | ICD-10-CM

## 2025-06-06 LAB
BACTERIA UR CULT: ABNORMAL
BACTERIA UR CULT: ABNORMAL
OTHER ANTIBIOTIC SUSC ISLT: ABNORMAL

## 2025-06-06 PROCEDURE — 81003 URINALYSIS AUTO W/O SCOPE: CPT | Performed by: FAMILY MEDICINE

## 2025-06-11 LAB
BILIRUB BLD-MCNC: NEGATIVE MG/DL
CLARITY, POC: CLEAR
COLOR UR: YELLOW
EXPIRATION DATE: ABNORMAL
GLUCOSE UR STRIP-MCNC: NEGATIVE MG/DL
KETONES UR QL: NEGATIVE
LEUKOCYTE EST, POC: ABNORMAL
Lab: ABNORMAL
NITRITE UR-MCNC: NEGATIVE MG/ML
PH UR: 6 [PH] (ref 5–8)
PROT UR STRIP-MCNC: NEGATIVE MG/DL
RBC # UR STRIP: ABNORMAL /UL
SP GR UR: 1.01 (ref 1–1.03)
UROBILINOGEN UR QL: ABNORMAL

## 2025-06-11 NOTE — TELEPHONE ENCOUNTER
Rx Refill Note  Requested Prescriptions      No prescriptions requested or ordered in this encounter      Last office visit with prescribing clinician: 6/2/2025   Last telemedicine visit with prescribing clinician: Visit date not found   Next office visit with prescribing clinician: Visit date not found                         Would you like a call back once the refill request has been completed: [] Yes [] No    If the office needs to give you a call back, can they leave a voicemail: [] Yes [] No    Ramona Forbes MA  06/11/25, 10:10 EDT

## 2025-06-12 DIAGNOSIS — R30.0 DYSURIA: ICD-10-CM

## 2025-06-12 RX ORDER — NITROFURANTOIN 25; 75 MG/1; MG/1
100 CAPSULE ORAL 2 TIMES DAILY
Qty: 6 CAPSULE | Refills: 0 | Status: SHIPPED | OUTPATIENT
Start: 2025-06-12